# Patient Record
Sex: MALE | Race: WHITE | NOT HISPANIC OR LATINO | Employment: UNEMPLOYED | URBAN - METROPOLITAN AREA
[De-identification: names, ages, dates, MRNs, and addresses within clinical notes are randomized per-mention and may not be internally consistent; named-entity substitution may affect disease eponyms.]

---

## 2019-07-07 ENCOUNTER — OFFICE VISIT (OUTPATIENT)
Dept: URGENT CARE | Facility: CLINIC | Age: 34
End: 2019-07-07
Payer: COMMERCIAL

## 2019-07-07 VITALS
SYSTOLIC BLOOD PRESSURE: 107 MMHG | RESPIRATION RATE: 18 BRPM | DIASTOLIC BLOOD PRESSURE: 49 MMHG | HEART RATE: 57 BPM | OXYGEN SATURATION: 98 % | TEMPERATURE: 97 F | WEIGHT: 175 LBS

## 2019-07-07 DIAGNOSIS — S02.2XXA CLOSED FRACTURE OF NASAL BONE, INITIAL ENCOUNTER: Primary | ICD-10-CM

## 2019-07-07 DIAGNOSIS — S09.93XA FACIAL INJURY, INITIAL ENCOUNTER: ICD-10-CM

## 2019-07-07 PROCEDURE — 99214 PR OFFICE/OUTPT VISIT, EST, LEVL IV, 30-39 MIN: ICD-10-PCS | Mod: S$GLB,,, | Performed by: FAMILY MEDICINE

## 2019-07-07 PROCEDURE — 70160 XR NASAL BONES: ICD-10-PCS | Mod: FY,S$GLB,, | Performed by: RADIOLOGY

## 2019-07-07 PROCEDURE — 70160 X-RAY EXAM OF NASAL BONES: CPT | Mod: FY,S$GLB,, | Performed by: RADIOLOGY

## 2019-07-07 PROCEDURE — 99214 OFFICE O/P EST MOD 30 MIN: CPT | Mod: S$GLB,,, | Performed by: FAMILY MEDICINE

## 2019-07-07 NOTE — PATIENT INSTRUCTIONS
Nose Fracture, with X-Ray  A broken bone, or fracture, of the nose may be a minor crack. Or it may be a major break, with the parts of your nose pushed out of place. A fractured nose causes pain, swelling, and nasal stuffiness. You may have bleeding from your nose. By tomorrow, you may have bruising around your eyes.  A minor fracture will heal in 3 to 4 weeks, with no more treatment needed. A major break that changes the shape of your nose must be treated by a nose specialist, called an ENT (ear, nose, and throat) doctor.The ENT doctor will straighten the bones in your nose. This is called a reduction. Some fractures may need a reduction as soon as possible, such as when bleeding from the nose won't stop. Otherwise, it is best to wait a few days until the swelling has gone down. The doctor will then be able to easily see when your nose is back in the right position.    Home care  · Use an ice pack on your nose for no more than 15 to 20 minutes at a time. Do this every 1 to 2 hours for the first 24 to 48 hours. Then use the ice as needed to ease pain and swelling. To make an ice pack, put ice cubes in a plastic bag that seals at the top. Wrap the bag in a clean, thin towel or cloth. Never put ice or an ice pack directly on the skin.  · Tell your provider if you are taking aspirin or blood-thinning medicine. These medicines make it more likely that your nose will bleed. Your provider may need to change your dose.  · You may use over-the-counter pain medicine to control pain, unless another medicine was prescribed. If you have chronic liver or kidney disease, talk with your provider before using this medicine.  · Dont drink alcohol or hot liquids for the next 2 days. Alcohol and hot liquids can dilate blood vessels in your nose. This can cause bleeding.  · Dont blow your nose for the first 2 days. Then, do so gently so you don't cause bleeding.  · Dont play contact sports in the next 6 weeks unless you can  protect your nose from getting injured again. You can wear a special custom-fitted plastic face mask to protect your nose.  Special note on concussions  If you had any symptoms of a concussion today, dont return to sports or any activity that could result in another head injury.  These are symptoms of a concussion:  · Nausea  · Vomiting  · Dizziness  · Confusion  · Headache  · Memory loss  · Loss of consciousness  Wait until all of your symptoms are gone and your provider says its OK to resume your activity. Having a second head injury before you fully recover from the first one can lead to serious brain injury.  Follow-up care  Follow up with your healthcare provider, or as advised. If your nose looks crooked after the swelling goes down, call the ENT doctor for an appointment within the next 10 days. Also make an appointment if its still hard to breathe through 1 or both sides of your nose. If you have trouble getting an ENT appointment, call your regular provider.  If the bones are out of place, a reduction should be done 6 to 10 days after the injury. In children, the reduction should be done 3 to 7 days after the injury. After that time, the bones are more difficult to move back into place.  If you had X-rays taken, you will be told of any new findings that may affect your care.  When to seek medical advice  Call your healthcare provider right away if any of these occur:  · Bleeding from your nose even after you have pinched your nostrils together for 15 minutes without stopping  · Swelling, pain, or redness on your face that gets worse  · Fever of 100.4°F (38°C) or above lasting for 24 to 48 hours  · Can't breathe from both sides of your nose after swelling goes down  · Sinus pain  Call 911  Call 911 if you have:  · Repeated vomiting  · Severe headache or dizziness  · Headache or dizziness that gets worse  · Abnormal drowsiness, or unable to wake up as usual  · Confusion or change in behavior or  speech  · Convulsion, or seizure  Date Last Reviewed: 12/3/2015  © 3231-7490 [employer]. [address] [place] [address], [place] [address]. All rights reserved. This information is not intended as a substitute for professional medical care. Always follow your healthcare professional's instructions.

## 2019-07-07 NOTE — PROGRESS NOTES
Subjective:       Patient ID: Mike Yadav is a 33 y.o. male.    Vitals:  weight is 79.4 kg (175 lb). His temperature is 97 °F (36.1 °C). His blood pressure is 107/49 (abnormal) and his pulse is 57 (abnormal). His respiration is 18 and oxygen saturation is 98%.     Chief Complaint: Facial Injury    ? Broken nose from boxing  Happened yesterday and again today    Facial Injury    The incident occurred 12 to 24 hours ago. Pertinent negatives include no blurred vision. He has tried nothing for the symptoms. The treatment provided no relief.       Constitution: Negative for fatigue.   HENT: Negative for facial swelling and facial trauma.    Neck: Negative for neck stiffness.   Cardiovascular: Negative for chest trauma.   Eyes: Negative for eye trauma, double vision and blurred vision.   Gastrointestinal: Negative for abdominal trauma, abdominal pain and rectal bleeding.   Genitourinary: Negative for hematuria, genital trauma and pelvic pain.   Musculoskeletal: Negative for pain, trauma, joint swelling, abnormal ROM of joint and pain with walking.   Skin: Negative for color change, wound, abrasion and laceration.   Neurological: Negative for dizziness, history of vertigo, light-headedness, coordination disturbances, altered mental status and loss of consciousness.   Hematologic/Lymphatic: Negative for history of bleeding disorder.   Psychiatric/Behavioral: Negative for altered mental status.       Objective:      Physical Exam   Constitutional: He is oriented to person, place, and time. He appears well-developed and well-nourished.   HENT:   Head: Normocephalic and atraumatic.   Right Ear: External ear normal.   Left Ear: External ear normal.   Mouth/Throat: Oropharynx is clear and moist.   Low soft palate and high riding tongue-suspect MARIO  Left nares with some dried blood, the rt nares is clear. The nose is markedly deviated to the left. And tender and swollen to the touch at the bridge. No orbital edema or  tenderness or deformity   Eyes: Pupils are equal, round, and reactive to light. Conjunctivae and EOM are normal.   Neck: Normal range of motion. Neck supple.   Neurological: He is alert and oriented to person, place, and time.   Skin: Skin is warm and dry.   Psychiatric: He has a normal mood and affect. His behavior is normal. Judgment and thought content normal.   Nursing note and vitals reviewed.      Assessment:       1. Closed fracture of nasal bone, initial encounter    2. Facial injury, initial encounter        Plan:         Closed fracture of nasal bone, initial encounter  -     Ambulatory referral to ENT    Facial injury, initial encounter  -     X-Ray Nasal Bones; Future; Expected date: 07/07/2019      Patient Instructions     Nose Fracture, with X-Ray  A broken bone, or fracture, of the nose may be a minor crack. Or it may be a major break, with the parts of your nose pushed out of place. A fractured nose causes pain, swelling, and nasal stuffiness. You may have bleeding from your nose. By tomorrow, you may have bruising around your eyes.  A minor fracture will heal in 3 to 4 weeks, with no more treatment needed. A major break that changes the shape of your nose must be treated by a nose specialist, called an ENT (ear, nose, and throat) doctor.The ENT doctor will straighten the bones in your nose. This is called a reduction. Some fractures may need a reduction as soon as possible, such as when bleeding from the nose won't stop. Otherwise, it is best to wait a few days until the swelling has gone down. The doctor will then be able to easily see when your nose is back in the right position.    Home care  · Use an ice pack on your nose for no more than 15 to 20 minutes at a time. Do this every 1 to 2 hours for the first 24 to 48 hours. Then use the ice as needed to ease pain and swelling. To make an ice pack, put ice cubes in a plastic bag that seals at the top. Wrap the bag in a clean, thin towel or cloth.  Never put ice or an ice pack directly on the skin.  · Tell your provider if you are taking aspirin or blood-thinning medicine. These medicines make it more likely that your nose will bleed. Your provider may need to change your dose.  · You may use over-the-counter pain medicine to control pain, unless another medicine was prescribed. If you have chronic liver or kidney disease, talk with your provider before using this medicine.  · Dont drink alcohol or hot liquids for the next 2 days. Alcohol and hot liquids can dilate blood vessels in your nose. This can cause bleeding.  · Dont blow your nose for the first 2 days. Then, do so gently so you don't cause bleeding.  · Dont play contact sports in the next 6 weeks unless you can protect your nose from getting injured again. You can wear a special custom-fitted plastic face mask to protect your nose.  Special note on concussions  If you had any symptoms of a concussion today, dont return to sports or any activity that could result in another head injury.  These are symptoms of a concussion:  · Nausea  · Vomiting  · Dizziness  · Confusion  · Headache  · Memory loss  · Loss of consciousness  Wait until all of your symptoms are gone and your provider says its OK to resume your activity. Having a second head injury before you fully recover from the first one can lead to serious brain injury.  Follow-up care  Follow up with your healthcare provider, or as advised. If your nose looks crooked after the swelling goes down, call the ENT doctor for an appointment within the next 10 days. Also make an appointment if its still hard to breathe through 1 or both sides of your nose. If you have trouble getting an ENT appointment, call your regular provider.  If the bones are out of place, a reduction should be done 6 to 10 days after the injury. In children, the reduction should be done 3 to 7 days after the injury. After that time, the bones are more difficult to move back into  place.  If you had X-rays taken, you will be told of any new findings that may affect your care.  When to seek medical advice  Call your healthcare provider right away if any of these occur:  · Bleeding from your nose even after you have pinched your nostrils together for 15 minutes without stopping  · Swelling, pain, or redness on your face that gets worse  · Fever of 100.4°F (38°C) or above lasting for 24 to 48 hours  · Can't breathe from both sides of your nose after swelling goes down  · Sinus pain  Call 911  Call 911 if you have:  · Repeated vomiting  · Severe headache or dizziness  · Headache or dizziness that gets worse  · Abnormal drowsiness, or unable to wake up as usual  · Confusion or change in behavior or speech  · Convulsion, or seizure  Date Last Reviewed: 12/3/2015  © 2019-0036 [employer]. [address] [place] [address]. All rights reserved. This information is not intended as a substitute for professional medical care. Always follow your healthcare professional's instructions.

## 2019-07-08 ENCOUNTER — OFFICE VISIT (OUTPATIENT)
Dept: URGENT CARE | Facility: CLINIC | Age: 34
End: 2019-07-08
Payer: COMMERCIAL

## 2019-07-08 VITALS
BODY MASS INDEX: 25.05 KG/M2 | HEIGHT: 70 IN | SYSTOLIC BLOOD PRESSURE: 121 MMHG | RESPIRATION RATE: 18 BRPM | DIASTOLIC BLOOD PRESSURE: 71 MMHG | HEART RATE: 62 BPM | OXYGEN SATURATION: 100 % | TEMPERATURE: 97 F | WEIGHT: 175 LBS

## 2019-07-08 DIAGNOSIS — S06.0X0A CONCUSSION WITHOUT LOSS OF CONSCIOUSNESS, INITIAL ENCOUNTER: Primary | ICD-10-CM

## 2019-07-08 DIAGNOSIS — R07.89 STERNAL PAIN: ICD-10-CM

## 2019-07-08 DIAGNOSIS — S02.2XXD CLOSED FRACTURE OF NASAL BONE WITH ROUTINE HEALING, SUBSEQUENT ENCOUNTER: ICD-10-CM

## 2019-07-08 PROCEDURE — 99214 OFFICE O/P EST MOD 30 MIN: CPT | Mod: S$GLB,,, | Performed by: FAMILY MEDICINE

## 2019-07-08 PROCEDURE — 99214 PR OFFICE/OUTPT VISIT, EST, LEVL IV, 30-39 MIN: ICD-10-PCS | Mod: S$GLB,,, | Performed by: FAMILY MEDICINE

## 2019-07-08 PROCEDURE — 3008F BODY MASS INDEX DOCD: CPT | Mod: CPTII,S$GLB,, | Performed by: FAMILY MEDICINE

## 2019-07-08 PROCEDURE — 71120 X-RAY EXAM BREASTBONE 2/>VWS: CPT | Mod: FY,S$GLB,, | Performed by: RADIOLOGY

## 2019-07-08 PROCEDURE — 3008F PR BODY MASS INDEX (BMI) DOCUMENTED: ICD-10-PCS | Mod: CPTII,S$GLB,, | Performed by: FAMILY MEDICINE

## 2019-07-08 PROCEDURE — 71120 XR STERNUM PA AND LATERAL: ICD-10-PCS | Mod: FY,S$GLB,, | Performed by: RADIOLOGY

## 2019-07-08 NOTE — LETTER
July 8, 2019      [facility]  [address] [address]  Phone: [phone]  Fax: [phone]       Patient: Mike Yadav   YOB: 1985  Date of Visit: 07/08/2019    To Whom It May Concern:    Dennise Yadav  was at [facility] on 07/08/2019 for a concussion. Please excuse from work for 3 days and then return to work/school on 7/11/19 with general guidelines to rest and avoid overstimulation until his symptoms resolve. If you have any questions or concerns, or if I can be of further assistance, please do not hesitate to contact me.    Sincerely,    Valarie Carcamo, DO
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
## 2019-07-08 NOTE — PATIENT INSTRUCTIONS
"  Concussion    A concussion can be caused by a direct blow to the head, neck, face, or somewhere else on the body with the force being transmitted to the head. This may cause you to lose consciousness - be "knocked out" - but not always. Depending on the severity of the blow, it will take from a few hours up to a few days to get better. Sometimes symptoms may last a few months or longer. This is called post-concussion syndrome.  At first, you may have a headache, nausea, vomiting, or dizziness. You may also have problems concentrating or remembering things. This is normal.  Symptoms should get better as the hours and days go by. Symptoms that get worse could be a sign of a more serious injury. This might be a bruise or bleeding in the brain. Thats why its important to watch for the warning signs listed below.  Home care  If your injury is mild and there are no serious signs or symptoms, your healthcare provider may recommend that you be monitored at home. If there is evidence that the injury is more serious, you will be monitored in the hospital. Follow these tips to help care for yourself at home:  · After a concussion, your healthcare provider may recommend that a family member or friend monitor you for 12 to 24 hours. They may be told to wake you every few hours during sleep to check for the signs below.  · If your face or scalp swells, apply an ice pack for 20 minutes every 1 to 2 hours. Do this until the swelling starts to go down. You can make an ice pack by putting ice cubes in a plastic bag and wrapping the bag in a towel.  · You may use acetaminophen to control pain, unless another pain medicine was prescribed. Do not use aspirin or ibuprofen after a head injury. If you have chronic liver or kidney disease, talk with your doctor before using these medicines. Also talk with your doctor if you ever had a stomach ulcer or gastrointestinal bleeding.  · For the next 24 hours:  ¨ Dont drink alcohol or take " sedatives or medicines that make you sleepy.  ¨ Dont drive or operate machinery.  ¨ Avoid doing anything strenuous. Dont lift or strain.  · Dont return to sports or any activity that could cause you to hit your head until all symptoms are gone and you have been cleared by your doctor. A second head injury before fully recovering from the first one can lead to serious brain injury.  · Avoid doing activities that require a lot of concentration or a lot of attention. This will allow your brain to rest and heal quicker.  Follow-up care  Follow up with your doctor in 1 week, or as directed.  Note: A radiologist will review any X-rays or CT scans that were taken. You will be told of any new findings that may affect your care.  When to seek medical advice  Call your healthcare provider right away if any of these occur:  · Repeated vomiting  · Headache or dizziness that is severe or gets worse  · Loss of consciousness  · Unusual drowsiness, or unable to wake up as usual  · Weakness or decreased ability to walk or move any limb  · Confusion, agitation, or change in behavior or speech, or memory loss  · Blurred vision  · Convulsion (seizure)  · Swelling on the scalp or face that gets worse  · Changes in pupil size (the black part of the eye)  · Redness, warmth, or pus from the swollen area  · Fluid draining from or bleeding from the nose or ears     Date Last Reviewed: 8/14/2015  © 8859-6523 [employer]. [address] [place] [address]. All rights reserved. This information is not intended as a substitute for professional medical care. Always follow your healthcare professional's instructions.

## 2021-08-17 ENCOUNTER — OFFICE VISIT (OUTPATIENT)
Dept: UROLOGY | Facility: CLINIC | Age: 36
End: 2021-08-17

## 2021-08-17 VITALS
HEART RATE: 53 BPM | SYSTOLIC BLOOD PRESSURE: 118 MMHG | DIASTOLIC BLOOD PRESSURE: 66 MMHG | BODY MASS INDEX: 25.56 KG/M2 | WEIGHT: 178.56 LBS | HEIGHT: 70 IN

## 2021-08-17 DIAGNOSIS — Z30.2 ENCOUNTER FOR MALE STERILIZATION PROCEDURE: Primary | ICD-10-CM

## 2021-08-17 PROCEDURE — 99999 PR PBB SHADOW E&M-EST. PATIENT-LVL III: CPT | Mod: PBBFAC,,, | Performed by: UROLOGY

## 2021-08-17 PROCEDURE — 99213 OFFICE O/P EST LOW 20 MIN: CPT | Mod: PBBFAC | Performed by: UROLOGY

## 2021-08-17 PROCEDURE — 99204 OFFICE O/P NEW MOD 45 MIN: CPT | Mod: S$PBB,,, | Performed by: UROLOGY

## 2021-08-17 PROCEDURE — 99204 PR OFFICE/OUTPT VISIT, NEW, LEVL IV, 45-59 MIN: ICD-10-PCS | Mod: S$PBB,,, | Performed by: UROLOGY

## 2021-08-17 PROCEDURE — 99999 PR PBB SHADOW E&M-EST. PATIENT-LVL III: ICD-10-PCS | Mod: PBBFAC,,, | Performed by: UROLOGY

## 2021-08-17 RX ORDER — LIDOCAINE HYDROCHLORIDE 10 MG/ML
20 INJECTION INFILTRATION; PERINEURAL ONCE
Status: COMPLETED | OUTPATIENT
Start: 2021-09-16 | End: 2021-10-08

## 2021-10-08 ENCOUNTER — PROCEDURE VISIT (OUTPATIENT)
Dept: UROLOGY | Facility: CLINIC | Age: 36
End: 2021-10-08

## 2021-10-08 VITALS
SYSTOLIC BLOOD PRESSURE: 165 MMHG | RESPIRATION RATE: 16 BRPM | WEIGHT: 176.38 LBS | HEIGHT: 70 IN | TEMPERATURE: 99 F | DIASTOLIC BLOOD PRESSURE: 72 MMHG | HEART RATE: 52 BPM | BODY MASS INDEX: 25.25 KG/M2

## 2021-10-08 DIAGNOSIS — Z30.2 ENCOUNTER FOR MALE STERILIZATION PROCEDURE: ICD-10-CM

## 2021-10-08 PROCEDURE — 55250 REMOVAL OF SPERM DUCT(S): CPT | Mod: PBBFAC | Performed by: UROLOGY

## 2021-10-08 PROCEDURE — 55250 REMOVAL OF SPERM DUCT(S): CPT | Mod: S$PBB,,, | Performed by: UROLOGY

## 2021-10-08 PROCEDURE — 55250 PR REMOVAL OF SPERM DUCT(S): ICD-10-PCS | Mod: S$PBB,,, | Performed by: UROLOGY

## 2021-10-08 RX ORDER — CEPHALEXIN 500 MG/1
500 CAPSULE ORAL 4 TIMES DAILY
Qty: 8 CAPSULE | Refills: 0 | Status: SHIPPED | OUTPATIENT
Start: 2021-10-08 | End: 2021-10-10

## 2021-10-08 RX ORDER — OXYCODONE AND ACETAMINOPHEN 5; 325 MG/1; MG/1
1 TABLET ORAL EVERY 4 HOURS PRN
Qty: 8 TABLET | Refills: 0 | Status: SHIPPED | OUTPATIENT
Start: 2021-10-08 | End: 2021-10-18

## 2021-10-08 RX ADMIN — LIDOCAINE HYDROCHLORIDE 20 ML: 10 INJECTION, SOLUTION INFILTRATION; PERINEURAL at 08:10

## 2021-11-18 ENCOUNTER — TELEPHONE (OUTPATIENT)
Dept: UROLOGY | Facility: CLINIC | Age: 36
End: 2021-11-18

## 2021-11-30 ENCOUNTER — IMMUNIZATION (OUTPATIENT)
Dept: PRIMARY CARE CLINIC | Facility: CLINIC | Age: 36
End: 2021-11-30

## 2021-11-30 DIAGNOSIS — Z23 NEED FOR VACCINATION: Primary | ICD-10-CM

## 2021-11-30 PROCEDURE — 0004A COVID-19, MRNA, LNP-S, PF, 30 MCG/0.3 ML DOSE VACCINE: CPT | Mod: CV19,PBBFAC | Performed by: INTERNAL MEDICINE

## 2021-12-08 ENCOUNTER — TELEPHONE (OUTPATIENT)
Dept: UROLOGY | Facility: CLINIC | Age: 36
End: 2021-12-08

## 2023-07-18 ENCOUNTER — TELEPHONE (OUTPATIENT)
Dept: NEUROLOGY | Facility: CLINIC | Age: 38
End: 2023-07-18

## 2023-07-18 NOTE — TELEPHONE ENCOUNTER
Called Pt to reschedule his August 8 appt since it was scheduled incorrectly. Pt needs to be scheduled in a concussion slot. I was unable to speak with him but left a vm.

## 2023-07-25 ENCOUNTER — TELEPHONE (OUTPATIENT)
Dept: NEUROLOGY | Facility: CLINIC | Age: 38
End: 2023-07-25

## 2023-07-25 NOTE — TELEPHONE ENCOUNTER
Called Pt twice to offer an appt for tomorrow. I was unable to speak with him.       ----- Message from Ivette Ball MA sent at 7/19/2023  5:01 PM CDT -----  Regarding: FW: Missed call  Contact: [phone]    ----- Message -----  From: Yolanda Keller  Sent: 7/19/2023   4:13 PM CDT  To: Annemarie Srinivasan Staff  Subject: Missed call                                      Calling in regards to missed call from office to reschedule with concussion clinic. Please call and discuss.

## 2023-08-02 ENCOUNTER — TELEPHONE (OUTPATIENT)
Dept: NEUROLOGY | Facility: CLINIC | Age: 38
End: 2023-08-02

## 2023-08-02 NOTE — TELEPHONE ENCOUNTER
Pt called stating he is willing to pay out of pocket now. Pt is scheduled for August 7 at [facility]. Pt was advised to arrive 30 mins early for the appt.     ----- Message from Ivette Ball MA sent at 7/27/2023  4:30 PM CDT -----  Regarding: FW: earlier appt  Contact: pt [phone]    ----- Message -----  From: Jen Scott  Sent: 7/27/2023   2:33 PM CDT  To: Annemarie Srinivasan Staff  Subject: earlier appt                                     Pt is calling to speak with someone in provider office in regards to getting an earlier appt pt stated he was trying to get an earlier appt and was offered a reschedule date pt also stated if possible he would need a date before the end of this month for the concussion he has pt  is asking for a return call please call pt at  [phone]

## 2023-08-14 ENCOUNTER — OFFICE VISIT (OUTPATIENT)
Dept: NEUROLOGY | Facility: CLINIC | Age: 38
End: 2023-08-14

## 2023-08-14 VITALS
BODY MASS INDEX: 25.94 KG/M2 | HEART RATE: 66 BPM | DIASTOLIC BLOOD PRESSURE: 72 MMHG | WEIGHT: 180.75 LBS | SYSTOLIC BLOOD PRESSURE: 128 MMHG

## 2023-08-14 DIAGNOSIS — S06.0X0S CONCUSSION WITHOUT LOSS OF CONSCIOUSNESS, SEQUELA: Primary | ICD-10-CM

## 2023-08-14 PROBLEM — S06.0X0A CONCUSSION WITH NO LOSS OF CONSCIOUSNESS: Status: ACTIVE | Noted: 2023-08-14

## 2023-08-14 PROCEDURE — 99204 PR OFFICE/OUTPT VISIT, NEW, LEVL IV, 45-59 MIN: ICD-10-PCS | Mod: S$PBB,,, | Performed by: PSYCHIATRY & NEUROLOGY

## 2023-08-14 PROCEDURE — 99204 OFFICE O/P NEW MOD 45 MIN: CPT | Mod: S$PBB,,, | Performed by: PSYCHIATRY & NEUROLOGY

## 2023-08-14 PROCEDURE — 99999 PR PBB SHADOW E&M-EST. PATIENT-LVL III: CPT | Mod: PBBFAC,,, | Performed by: PSYCHIATRY & NEUROLOGY

## 2023-08-14 PROCEDURE — 99213 OFFICE O/P EST LOW 20 MIN: CPT | Mod: PBBFAC | Performed by: PSYCHIATRY & NEUROLOGY

## 2023-08-14 PROCEDURE — 99999 PR PBB SHADOW E&M-EST. PATIENT-LVL III: ICD-10-PCS | Mod: PBBFAC,,, | Performed by: PSYCHIATRY & NEUROLOGY

## 2023-08-14 NOTE — PATIENT INSTRUCTIONS
Would hold off on sparring for 60 days post injury  May continue conditioning and weight room work at this time  If symptoms return during or after increased activity, decrease activity and call the clinic at [phone] or contact me thru [facility].
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
## 2024-04-28 ENCOUNTER — HOSPITAL ENCOUNTER (EMERGENCY)
Facility: OTHER | Age: 39
Discharge: HOME | End: 2024-04-28
Attending: EMERGENCY MEDICINE

## 2024-04-28 VITALS
WEIGHT: 170 LBS | OXYGEN SATURATION: 99 % | HEART RATE: 70 BPM | DIASTOLIC BLOOD PRESSURE: 78 MMHG | SYSTOLIC BLOOD PRESSURE: 128 MMHG | RESPIRATION RATE: 16 BRPM | BODY MASS INDEX: 25.18 KG/M2 | TEMPERATURE: 98 F | HEIGHT: 69 IN

## 2024-04-28 DIAGNOSIS — S01.312A COMPLEX LACERATION OF LEFT EAR, INITIAL ENCOUNTER: Primary | ICD-10-CM

## 2024-04-28 PROCEDURE — 25000003 PHARM REV CODE 250: Performed by: EMERGENCY MEDICINE

## 2024-04-28 PROCEDURE — 99284 EMERGENCY DEPT VISIT MOD MDM: CPT | Mod: 25

## 2024-04-28 PROCEDURE — 90715 TDAP VACCINE 7 YRS/> IM: CPT | Performed by: EMERGENCY MEDICINE

## 2024-04-28 PROCEDURE — 13152 CMPLX RPR E/N/E/L 2.6-7.5 CM: CPT

## 2024-04-28 PROCEDURE — 90471 IMMUNIZATION ADMIN: CPT | Performed by: EMERGENCY MEDICINE

## 2024-04-28 PROCEDURE — 63600175 PHARM REV CODE 636 W HCPCS: Performed by: EMERGENCY MEDICINE

## 2024-04-28 PROCEDURE — 25000003 PHARM REV CODE 250: Performed by: INTERNAL MEDICINE

## 2024-04-28 RX ORDER — CIPROFLOXACIN 500 MG/1
500 TABLET ORAL 2 TIMES DAILY
Qty: 14 TABLET | Refills: 0 | Status: SHIPPED | OUTPATIENT
Start: 2024-04-28 | End: 2024-05-05

## 2024-04-28 RX ORDER — LIDOCAINE HYDROCHLORIDE AND EPINEPHRINE 10; 10 MG/ML; UG/ML
20 INJECTION, SOLUTION INFILTRATION; PERINEURAL ONCE
Status: DISCONTINUED | OUTPATIENT
Start: 2024-04-28 | End: 2024-04-28

## 2024-04-28 RX ORDER — LIDOCAINE HYDROCHLORIDE AND EPINEPHRINE 10; 10 MG/ML; UG/ML
1 INJECTION, SOLUTION INFILTRATION; PERINEURAL ONCE
Status: COMPLETED | OUTPATIENT
Start: 2024-04-28 | End: 2024-04-28

## 2024-04-28 RX ORDER — LIDOCAINE HYDROCHLORIDE 10 MG/ML
10 INJECTION, SOLUTION EPIDURAL; INFILTRATION; INTRACAUDAL; PERINEURAL ONCE
Status: COMPLETED | OUTPATIENT
Start: 2024-04-28 | End: 2024-04-28

## 2024-04-28 RX ORDER — HYDROCODONE BITARTRATE AND ACETAMINOPHEN 5; 325 MG/1; MG/1
1 TABLET ORAL EVERY 4 HOURS PRN
Qty: 12 TABLET | Refills: 0 | Status: SHIPPED | OUTPATIENT
Start: 2024-04-28 | End: 2024-05-08

## 2024-04-28 RX ADMIN — LIDOCAINE HYDROCHLORIDE 100 MG: 10 INJECTION, SOLUTION EPIDURAL; INFILTRATION; INTRACAUDAL at 05:04

## 2024-04-28 RX ADMIN — LIDOCAINE HYDROCHLORIDE,EPINEPHRINE BITARTRATE 1 ML: 10; .01 INJECTION, SOLUTION INFILTRATION; PERINEURAL at 07:04

## 2024-04-28 RX ADMIN — TETANUS TOXOID, REDUCED DIPHTHERIA TOXOID AND ACELLULAR PERTUSSIS VACCINE, ADSORBED 0.5 ML: 5; 2.5; 8; 8; 2.5 SUSPENSION INTRAMUSCULAR at 07:04

## 2024-04-28 NOTE — ED PROVIDER NOTES
Encounter Date: 2024       History     Chief Complaint   Patient presents with    Laceration     Laceration to left ear, states was wrestling with a friend, bleeding controlled,      Seen:  6:05 a.m.  38-year-old male  presents complaint of an injury to the left ear.  He reports that he was wrestling with a friend and his ear was pulled on the corner of furniture.  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  He states his last tetanus shot was greater than 5 years ago.  He denies any other injury.    The history is provided by the patient.     Review of patient's allergies indicates:  No Known Allergies  No past medical history on file.  Past Surgical History:   Procedure Laterality Date    LEG SURGERY      NOSE SURGERY       Family History   Problem Relation Name Age of Onset    No Known Problems Mother      No Known Problems Father      Multiple sclerosis Paternal Uncle       Social History     Tobacco Use    Smoking status: Former     Current packs/day: 0.00     Types: Cigarettes     Quit date: 2019     Years since quittin.8    Smokeless tobacco: Never   Substance Use Topics    Alcohol use: Yes     Comment: socially    Drug use: Yes     Types: Marijuana     Review of Systems   Constitutional:  Negative for chills and fever.   HENT:  Negative for congestion and sore throat.    Eyes:  Negative for visual disturbance.   Respiratory:  Negative for cough and shortness of breath.    Cardiovascular:  Negative for chest pain and palpitations.   Gastrointestinal:  Negative for abdominal pain, diarrhea and vomiting.   Genitourinary:  Negative for decreased urine volume, dysuria and frequency.   Musculoskeletal:  Negative for joint swelling, neck pain and neck stiffness.   Skin:  Positive for wound (Left ear laceration). Negative for rash.   Neurological:  Negative for weakness, numbness and headaches.   Psychiatric/Behavioral:  Negative for  behavioral problems and confusion.        Physical Exam     Initial Vitals [04/28/24 0336]   BP Pulse Resp Temp SpO2   135/82 78 16 98.2 °F (36.8 °C) 98 %      MAP       --         Physical Exam    Constitutional: He appears well-developed and well-nourished.   HENT:   Head: Normocephalic.   Nose: Nose normal.   Mouth/Throat: Oropharynx is clear and moist.   Left external ear with large complex full-thickness laceration involving both skin and cartilage.  See image below.  Wound measures approximately 4 cm.   Eyes: Conjunctivae and EOM are normal. Pupils are equal, round, and reactive to light.   Neck: Neck supple.   Normal range of motion.  Cardiovascular:  Normal rate and regular rhythm.     Exam reveals no gallop and no friction rub.       No murmur heard.  Pulmonary/Chest: Breath sounds normal. No respiratory distress. He has no wheezes. He has no rales.   Abdominal: Abdomen is soft. Bowel sounds are normal. There is no abdominal tenderness. There is no rebound and no guarding.   Musculoskeletal:         General: No tenderness or edema.      Cervical back: Normal range of motion and neck supple.     Neurological: He is alert and oriented to person, place, and time. He has normal strength. No cranial nerve deficit or sensory deficit. Gait normal. GCS score is 15. GCS eye subscore is 4. GCS verbal subscore is 5. GCS motor subscore is 6.   Skin: Skin is warm and dry. No rash noted.   Psychiatric: He has a normal mood and affect. His speech is normal and behavior is normal.         ED Course   Procedures  Labs Reviewed - No data to display       Imaging Results    None          Medications   LIDOcaine (PF) 10 mg/ml (1%) injection 100 mg (100 mg Other Given by Other 4/28/24 3589)   Tdap (BOOSTRIX) vaccine injection 0.5 mL (0.5 mLs Intramuscular Given 4/28/24 6277)   LIDOcaine-EPINEPHrine 1%-1:100,000 injection 1 mL (1 mL Intradermal Given by Provider 4/28/24 8429)     Medical Decision Making  Emergent evaluation a  38-year-old male who presents with complex ear laceration.  There is no evidence for any associated injury.  Vital signs are benign, afebrile.  On exam there is a complex full-thickness laceration involving cartilage.  Plastic surgery Dr. Sharpe came to the bedside and performed complex repair.  Patient is discharged in good condition with prescription for Cipro given cartilage involvement and Norco for pain.  All sutures placed were absorbable and will not require removal.  He is discharged with strict return precautions and encouraged close follow-up.    Amount and/or Complexity of Data Reviewed  Discussion of management or test interpretation with external provider(s): I discussed the case with Dr. Sharpe, plastic surgery.    Risk  Prescription drug management.  Minor surgery with no identified risk factors.               ED Course as of 04/28/24 1013   Sun Apr 28, 2024   0634 I successfully paged Plastic surgery through the answering service. [AK]   5744 I spoke with Dr. Redman, plastic surgery.  He will evaluate the patient and make repair. [AK]   0417 Plastic surgery is at the bedside. [AK]      ED Course User Index  [AK] Pricilla Fraire MD                           Clinical Impression:  Final diagnoses:  [S01.312A] Complex laceration of left ear, initial encounter (Primary)          ED Disposition Condition    Discharge Stable          ED Prescriptions       Medication Sig Dispense Start Date End Date Auth. Provider    ciprofloxacin HCl (CIPRO) 500 MG tablet Take 1 tablet (500 mg total) by mouth 2 (two) times a day. for 7 days 14 tablet 4/28/2024 5/5/2024 Pricilla Fraire MD    HYDROcodone-acetaminophen (NORCO) 5-325 mg per tablet Take 1 tablet by mouth every 4 (four) hours as needed for Pain. 12 tablet 4/28/2024 5/8/2024 Pricilla Fraire MD          Follow-up Information       Follow up With Specialties Details Why Contact Info    Your regular primary care doctor  Schedule an appointment as soon as possible  for a visit  For symptom recheck and close follow-up     [facility] Emergency Medicine  As needed, [facility] symptoms worsen [address] [facility] [id number] [phone]             Pricilla Fraire MD  04/28/24 1017

## 2024-04-28 NOTE — DISCHARGE INSTRUCTIONS
Keep wound clean and dry, do not soak or swim until healed.  Take antibiotics as directed.  Return to the ER immediately for new or worsening symptoms such as redness, drainage of pus, fever, or other concerns.

## 2024-04-28 NOTE — ED TRIAGE NOTES
Patient presents to the ED with complaints of having a laceration to the left ear. States injury occurred while wresting tonight. No active bleeding at this time. No distress noted.

## 2024-04-28 NOTE — CONSULTS
"Plastic Surgery Consult Note    HPI: Mike Yadav is a 38 year old male  presenting with left ear laceration after wrestling earlier this morning around 2 am. Laceration occurred when his ear was pulled on corner of the furniture. He has bilateral preexisting cauliflower ears.     PMH:   No past medical history on file.    Surg:   Past Surgical History:   Procedure Laterality Date    LEG SURGERY      NOSE SURGERY         Social:     Review of patient's allergies indicates:  No Known Allergies      Current Outpatient Medications   Medication Instructions    ciprofloxacin HCl (CIPRO) 500 mg, Oral, 2 times daily    HYDROcodone-acetaminophen (NORCO) 5-325 mg per tablet 1 tablet, Oral, Every 4 hours PRN         Blood pressure 135/82, pulse 78, temperature 98.2 °F (36.8 °C), temperature source Oral, resp. rate 16, height 5' 9" (1.753 m), weight 77.1 kg (170 lb), SpO2 98%.  PE:  NAD  EOMI, left ear stellate laceration over superior helical rim extending into scapha anteriorly. Cartilage is exposed and lacerated. Scarring present from prior history of cauliflower ear.   RRR  Non-labored breathing      A/P:   Mike Yadav is a 38 year old male  presenting with a left ear laceration through cartilage    - laceration repaired at bedside  - recommend cipro for antibiotics  - remove xeroform packing in 2 days  - bacitracin BID to laceration repair  - head of bed elevation   - pain control per ED  - Follow up with plastic surgery PRN    Procedure note:  After verbal consent and a formal timeout was performed 10 ccs of 1% Lidocaine with epinephrine was used to infiltrate around ear as a ring block. The laceration was copiously irrigated with 1 liter of normal saline and prepped with betadine solution. The ear cartilage was approximated with 4-0 vicryl suture in interrupted fashion. Skin was approximated with 5-0 fast gut in simple interrupted fashion. The skin was cleaned and the scapha " packed with a strip of xeroform. Patient tolerated procedure well without complication.     Gautam Sharpe MD  [facility], PGY-IV  04/28/2024  10:13 AM

## 2025-07-14 ENCOUNTER — HOSPITAL ENCOUNTER (OUTPATIENT)
Facility: OTHER | Age: 40
Discharge: HOME | End: 2025-07-15
Attending: EMERGENCY MEDICINE | Admitting: HOSPITALIST
Payer: COMMERCIAL

## 2025-07-14 DIAGNOSIS — L03.115 CELLULITIS AND ABSCESS OF RIGHT LEG: Primary | ICD-10-CM

## 2025-07-14 DIAGNOSIS — L02.91 ABSCESS: ICD-10-CM

## 2025-07-14 DIAGNOSIS — L02.415 CELLULITIS AND ABSCESS OF RIGHT LEG: Primary | ICD-10-CM

## 2025-07-14 DIAGNOSIS — R07.9 CHEST PAIN: ICD-10-CM

## 2025-07-14 DIAGNOSIS — L03.115 CELLULITIS OF RIGHT LOWER EXTREMITY: ICD-10-CM

## 2025-07-14 DIAGNOSIS — Z16.20 THERAPY FAILURE DUE TO ANTIBIOTIC RESISTANCE: ICD-10-CM

## 2025-07-14 DIAGNOSIS — M79.89 LEG SWELLING: ICD-10-CM

## 2025-07-14 LAB
ABSOLUTE EOSINOPHIL (OHS): 0.15 K/UL
ABSOLUTE MONOCYTE (OHS): 0.98 K/UL (ref 0.3–1)
ABSOLUTE NEUTROPHIL COUNT (OHS): 10.88 K/UL (ref 1.8–7.7)
ALBUMIN SERPL BCP-MCNC: 4 G/DL (ref 3.5–5.2)
ALP SERPL-CCNC: 94 UNIT/L (ref 40–150)
ALT SERPL W/O P-5'-P-CCNC: 15 UNIT/L (ref 10–44)
ANION GAP (OHS): 11 MMOL/L (ref 8–16)
AST SERPL-CCNC: 21 UNIT/L (ref 11–45)
BASOPHILS # BLD AUTO: 0.07 K/UL
BASOPHILS NFR BLD AUTO: 0.5 %
BILIRUB SERPL-MCNC: 0.7 MG/DL (ref 0.1–1)
BUN SERPL-MCNC: 11 MG/DL (ref 6–20)
CALCIUM SERPL-MCNC: 9.3 MG/DL (ref 8.7–10.5)
CHLORIDE SERPL-SCNC: 101 MMOL/L (ref 95–110)
CO2 SERPL-SCNC: 25 MMOL/L (ref 23–29)
CREAT SERPL-MCNC: 0.8 MG/DL (ref 0.5–1.4)
CRP SERPL-MCNC: 115.2 MG/L
ERYTHROCYTE [DISTWIDTH] IN BLOOD BY AUTOMATED COUNT: 12.7 % (ref 11.5–14.5)
ERYTHROCYTE [SEDIMENTATION RATE] IN BLOOD BY PHOTOMETRIC METHOD: 75 MM/HR
GFR SERPLBLD CREATININE-BSD FMLA CKD-EPI: >60 ML/MIN/1.73/M2
GLUCOSE SERPL-MCNC: 89 MG/DL (ref 70–110)
HCT VFR BLD AUTO: 39.7 % (ref 40–54)
HGB BLD-MCNC: 13 GM/DL (ref 14–18)
HOLD SPECIMEN: NORMAL
IMM GRANULOCYTES # BLD AUTO: 0.06 K/UL (ref 0–0.04)
IMM GRANULOCYTES NFR BLD AUTO: 0.5 % (ref 0–0.5)
LACTATE SERPL-SCNC: 0.8 MMOL/L (ref 0.5–2.2)
LYMPHOCYTES # BLD AUTO: 1.17 K/UL (ref 1–4.8)
MCH RBC QN AUTO: 31 PG (ref 27–31)
MCHC RBC AUTO-ENTMCNC: 32.7 G/DL (ref 32–36)
MCV RBC AUTO: 95 FL (ref 82–98)
NUCLEATED RBC (/100WBC) (OHS): 0 /100 WBC
PLATELET # BLD AUTO: 332 K/UL (ref 150–450)
PMV BLD AUTO: 9.1 FL (ref 9.2–12.9)
POTASSIUM SERPL-SCNC: 3.8 MMOL/L (ref 3.5–5.1)
PROCALCITONIN SERPL-MCNC: 0.05 NG/ML
PROT SERPL-MCNC: 8.2 GM/DL (ref 6–8.4)
RBC # BLD AUTO: 4.2 M/UL (ref 4.6–6.2)
RELATIVE EOSINOPHIL (OHS): 1.1 %
RELATIVE LYMPHOCYTE (OHS): 8.8 % (ref 18–48)
RELATIVE MONOCYTE (OHS): 7.4 % (ref 4–15)
RELATIVE NEUTROPHIL (OHS): 81.7 % (ref 38–73)
SODIUM SERPL-SCNC: 137 MMOL/L (ref 136–145)
WBC # BLD AUTO: 13.31 K/UL (ref 3.9–12.7)

## 2025-07-14 PROCEDURE — 84145 PROCALCITONIN (PCT): CPT | Performed by: EMERGENCY MEDICINE

## 2025-07-14 PROCEDURE — 83605 ASSAY OF LACTIC ACID: CPT | Performed by: EMERGENCY MEDICINE

## 2025-07-14 PROCEDURE — G0378 HOSPITAL OBSERVATION PER HR: HCPCS

## 2025-07-14 PROCEDURE — 85025 COMPLETE CBC W/AUTO DIFF WBC: CPT | Performed by: EMERGENCY MEDICINE

## 2025-07-14 PROCEDURE — 25500020 PHARM REV CODE 255: Performed by: EMERGENCY MEDICINE

## 2025-07-14 PROCEDURE — 96372 THER/PROPH/DIAG INJ SC/IM: CPT | Performed by: HOSPITALIST

## 2025-07-14 PROCEDURE — 99204 OFFICE O/P NEW MOD 45 MIN: CPT | Mod: 25,,, | Performed by: SURGERY

## 2025-07-14 PROCEDURE — 25000003 PHARM REV CODE 250: Performed by: EMERGENCY MEDICINE

## 2025-07-14 PROCEDURE — 87040 BLOOD CULTURE FOR BACTERIA: CPT | Mod: 91 | Performed by: HOSPITALIST

## 2025-07-14 PROCEDURE — 25000003 PHARM REV CODE 250: Performed by: HOSPITALIST

## 2025-07-14 PROCEDURE — 63600175 PHARM REV CODE 636 W HCPCS: Performed by: EMERGENCY MEDICINE

## 2025-07-14 PROCEDURE — 85652 RBC SED RATE AUTOMATED: CPT | Performed by: EMERGENCY MEDICINE

## 2025-07-14 PROCEDURE — 86140 C-REACTIVE PROTEIN: CPT | Performed by: EMERGENCY MEDICINE

## 2025-07-14 PROCEDURE — A4216 STERILE WATER/SALINE, 10 ML: HCPCS | Performed by: HOSPITALIST

## 2025-07-14 PROCEDURE — 80053 COMPREHEN METABOLIC PANEL: CPT | Performed by: EMERGENCY MEDICINE

## 2025-07-14 PROCEDURE — 63600175 PHARM REV CODE 636 W HCPCS: Performed by: HOSPITALIST

## 2025-07-14 RX ORDER — BISACODYL 10 MG/1
10 SUPPOSITORY RECTAL DAILY PRN
Status: DISCONTINUED | OUTPATIENT
Start: 2025-07-14 | End: 2025-07-15 | Stop reason: HOSPADM

## 2025-07-14 RX ORDER — ACETAMINOPHEN 325 MG/1
650 TABLET ORAL EVERY 4 HOURS PRN
Status: DISCONTINUED | OUTPATIENT
Start: 2025-07-14 | End: 2025-07-15 | Stop reason: HOSPADM

## 2025-07-14 RX ORDER — CEFEPIME HYDROCHLORIDE 2 G/1
2 INJECTION, POWDER, FOR SOLUTION INTRAVENOUS
Status: COMPLETED | OUTPATIENT
Start: 2025-07-14 | End: 2025-07-14

## 2025-07-14 RX ORDER — SODIUM CHLORIDE 0.9 % (FLUSH) 0.9 %
10 SYRINGE (ML) INJECTION EVERY 8 HOURS
Status: DISCONTINUED | OUTPATIENT
Start: 2025-07-14 | End: 2025-07-15 | Stop reason: HOSPADM

## 2025-07-14 RX ORDER — VANCOMYCIN 2 GRAM/500 ML IN 0.9 % SODIUM CHLORIDE INTRAVENOUS
2000 ONCE
Status: COMPLETED | OUTPATIENT
Start: 2025-07-14 | End: 2025-07-14

## 2025-07-14 RX ORDER — ONDANSETRON HYDROCHLORIDE 2 MG/ML
4 INJECTION, SOLUTION INTRAVENOUS EVERY 8 HOURS PRN
Status: DISCONTINUED | OUTPATIENT
Start: 2025-07-14 | End: 2025-07-15 | Stop reason: HOSPADM

## 2025-07-14 RX ORDER — PROCHLORPERAZINE EDISYLATE 5 MG/ML
5 INJECTION INTRAMUSCULAR; INTRAVENOUS EVERY 6 HOURS PRN
Status: DISCONTINUED | OUTPATIENT
Start: 2025-07-14 | End: 2025-07-15 | Stop reason: HOSPADM

## 2025-07-14 RX ORDER — GLUCAGON 1 MG
1 KIT INJECTION
Status: DISCONTINUED | OUTPATIENT
Start: 2025-07-14 | End: 2025-07-15 | Stop reason: HOSPADM

## 2025-07-14 RX ORDER — IBUPROFEN 200 MG
16 TABLET ORAL
Status: DISCONTINUED | OUTPATIENT
Start: 2025-07-14 | End: 2025-07-15 | Stop reason: HOSPADM

## 2025-07-14 RX ORDER — POLYETHYLENE GLYCOL 3350 17 G/17G
17 POWDER, FOR SOLUTION ORAL DAILY
Status: DISCONTINUED | OUTPATIENT
Start: 2025-07-15 | End: 2025-07-15 | Stop reason: HOSPADM

## 2025-07-14 RX ORDER — ENOXAPARIN SODIUM 100 MG/ML
40 INJECTION SUBCUTANEOUS EVERY 24 HOURS
Status: DISCONTINUED | OUTPATIENT
Start: 2025-07-14 | End: 2025-07-15 | Stop reason: HOSPADM

## 2025-07-14 RX ORDER — TALC
6 POWDER (GRAM) TOPICAL NIGHTLY PRN
Status: DISCONTINUED | OUTPATIENT
Start: 2025-07-14 | End: 2025-07-15 | Stop reason: HOSPADM

## 2025-07-14 RX ORDER — CEFTRIAXONE 1 G/1
1 INJECTION, POWDER, FOR SOLUTION INTRAMUSCULAR; INTRAVENOUS
Status: DISCONTINUED | OUTPATIENT
Start: 2025-07-14 | End: 2025-07-15 | Stop reason: HOSPADM

## 2025-07-14 RX ORDER — ESCITALOPRAM OXALATE 10 MG/1
10 TABLET ORAL DAILY
COMMUNITY

## 2025-07-14 RX ORDER — CLINDAMYCIN PHOSPHATE 600 MG/50ML
600 INJECTION, SOLUTION INTRAVENOUS
Status: COMPLETED | OUTPATIENT
Start: 2025-07-14 | End: 2025-07-14

## 2025-07-14 RX ORDER — IBUPROFEN 200 MG
24 TABLET ORAL
Status: DISCONTINUED | OUTPATIENT
Start: 2025-07-14 | End: 2025-07-15 | Stop reason: HOSPADM

## 2025-07-14 RX ORDER — INSULIN ASPART 100 [IU]/ML
0-5 INJECTION, SOLUTION INTRAVENOUS; SUBCUTANEOUS
Status: DISCONTINUED | OUTPATIENT
Start: 2025-07-14 | End: 2025-07-15 | Stop reason: HOSPADM

## 2025-07-14 RX ADMIN — CEFEPIME 2 G: 2 INJECTION, POWDER, FOR SOLUTION INTRAVENOUS at 03:07

## 2025-07-14 RX ADMIN — IOHEXOL 75 ML: 350 INJECTION, SOLUTION INTRAVENOUS at 01:07

## 2025-07-14 RX ADMIN — CEFTRIAXONE 1 G: 1 INJECTION, POWDER, FOR SOLUTION INTRAMUSCULAR; INTRAVENOUS at 05:07

## 2025-07-14 RX ADMIN — CLINDAMYCIN PHOSPHATE 600 MG: 600 INJECTION, SOLUTION INTRAVENOUS at 03:07

## 2025-07-14 RX ADMIN — Medication 10 ML: at 10:07

## 2025-07-14 RX ADMIN — ENOXAPARIN SODIUM 40 MG: 40 INJECTION SUBCUTANEOUS at 05:07

## 2025-07-14 RX ADMIN — SODIUM CHLORIDE 2000 MG: 9 INJECTION, SOLUTION INTRAVENOUS at 12:07

## 2025-07-14 NOTE — ED PROVIDER NOTES
Encounter Date: 7/14/2025    SCRIBE #1 NOTE: I, Morenita Leone, am scribing for, and in the presence of,  Pricilla Fraire MD. I have scribed the following portions of the note - Other sections scribed: HPI, ROS.       History     Chief Complaint   Patient presents with    Leg Pain     Pt. Present to the Ed with right leg pain. Pt. Admits to kicking a stop sign 2 months ago and cutting his leg. Pt. Was taking antibiotics for 15 days an it was getting better. Now it's  swollen ,red an hot to touch. Pt. Is alert an all vitals are within normal limits. GSC-15     39 y.o. male who presents with complaint of right leg pain with swelling and redness. He reports kicking a stop sign and cutting his leg 2 months ago. Following the injury, he did not see a physician, but the wound seemed to heal. He is an  and notes that he stopped training after the injury for about two weeks.  When he started back training he noticed increasing redness and pain of his right leg. He went to an Urgent Care where he was given antibiotics with improvement.  He is allergic to penicillin, but completed a course of doxycycline and topical Bactroban ointment with resolution of redness.    He has noticed recurrent redness, swelling, and pain of his right leg over the past 3 days. He reports subjective fever and chills yesterday. His pain worsens with ambulation and to touch, no pain at rest. He denies pus or drainage from the area. He does not use tobacco products. He drinks about 4-6 drinks daily. He occasionally uses marijuana but denies illicit drug use. This is the extent of the patient's complaints at this time.          The history is provided by the patient. No  was used.     Review of patient's allergies indicates:   Allergen Reactions    Amoxicillin Hives     Past Medical History:   Diagnosis Date    Concussion 2019     Past Surgical History:   Procedure Laterality Date    LEG SURGERY Bilateral     R femur fx  and L tib/fib fx repair    NOSE SURGERY      Fracture repair in 2019     Family History   Problem Relation Name Age of Onset    No Known Problems Mother      No Known Problems Father      No Known Problems Sister      Multiple sclerosis Paternal Uncle       Social History[1] positive for tobacco, occasional alcohol.  No injection drugs.  Review of Systems   Constitutional:  Negative for chills and fever.   HENT:  Negative for congestion and sore throat.    Eyes:  Negative for visual disturbance.   Respiratory:  Negative for cough and shortness of breath.    Cardiovascular:  Negative for chest pain and palpitations.   Gastrointestinal:  Negative for abdominal pain, diarrhea and vomiting.   Genitourinary:  Negative for decreased urine volume, dysuria and frequency.   Musculoskeletal:  Positive for arthralgias. Negative for joint swelling, neck pain and neck stiffness.   Skin:  Positive for color change and wound. Negative for rash.   Neurological:  Negative for weakness, numbness and headaches.   Psychiatric/Behavioral:  Negative for behavioral problems and confusion.        Physical Exam     Initial Vitals [07/14/25 1106]   BP Pulse Resp Temp SpO2   135/77 73 18 98.1 °F (36.7 °C) 98 %      MAP       --         Physical Exam    Constitutional: He appears well-developed and well-nourished.   HENT:   Head: Normocephalic and atraumatic.   Nose: Nose normal. Mouth/Throat: Oropharynx is clear and moist.   Eyes: Conjunctivae and EOM are normal. Pupils are equal, round, and reactive to light.   Neck: Neck supple.   Normal range of motion.  Cardiovascular:  Normal rate and regular rhythm.     Exam reveals no gallop and no friction rub.       No murmur heard.  Pulmonary/Chest: Breath sounds normal. No respiratory distress. He has no wheezes. He has no rales.   Abdominal: Abdomen is soft. Bowel sounds are normal. There is no abdominal tenderness. There is no rebound and no guarding.   Musculoskeletal:         General:  Tenderness and edema present.      Cervical back: Normal range of motion and neck supple.      Comments: Right mid lower leg with large amount of swelling and erythema with fluctuance over the tibia.  There is a small overlying abrasion without drainage.     Neurological: He is alert and oriented to person, place, and time. He has normal strength. No cranial nerve deficit or sensory deficit. Gait normal. GCS score is 15. GCS eye subscore is 4. GCS verbal subscore is 5. GCS motor subscore is 6.   Skin: Skin is warm and dry. No rash noted.   Psychiatric: He has a normal mood and affect. His speech is normal and behavior is normal.         ED Course   Procedures  Labs Reviewed   SEDIMENTATION RATE - Abnormal       Result Value    Sed Rate 75 (*)    C-REACTIVE PROTEIN - Abnormal    .2 (*)    CBC WITH DIFFERENTIAL - Abnormal    WBC 13.31 (*)     RBC 4.20 (*)     HGB 13.0 (*)     HCT 39.7 (*)     MCV 95      MCH 31.0      MCHC 32.7      RDW 12.7      Platelet Count 332      MPV 9.1 (*)     Nucleated RBC 0      Neut % 81.7 (*)     Lymph % 8.8 (*)     Mono % 7.4      Eos % 1.1      Basophil % 0.5      Imm Grans % 0.5      Neut # 10.88 (*)     Lymph # 1.17      Mono # 0.98      Eos # 0.15      Baso # 0.07      Imm Grans # 0.06 (*)    COMPREHENSIVE METABOLIC PANEL - Normal    Sodium 137      Potassium 3.8      Chloride 101      CO2 25      Glucose 89      BUN 11      Creatinine 0.8      Calcium 9.3      Protein Total 8.2      Albumin 4.0      Bilirubin Total 0.7      ALP 94      AST 21      ALT 15      Anion Gap 11      eGFR >60     LACTIC ACID, PLASMA - Normal    Lactic Acid Level 0.8      Narrative:     Falsely low lactic acid results can be found in samples containing >=13.0 mg/dL total bilirubin and/or >=3.5 mg/dL direct bilirubin.    PROCALCITONIN - Normal    Procalcitonin 0.05     CBC W/ AUTO DIFFERENTIAL    Narrative:     The following orders were created for panel order CBC auto differential.  Procedure                                Abnormality         Status                     ---------                               -----------         ------                     CBC with Differential[[id number]]       Abnormal            Final result                 Please view results for these tests on the individual orders.   EXTRA TUBES    Narrative:     The following orders were created for panel order EXTRA TUBES.  Procedure                               Abnormality         Status                     ---------                               -----------         ------                     Light Green Top Hold[[id number]]                            Final result                 Please view results for these tests on the individual orders.   LIGHT GREEN TOP HOLD    Extra Tube Hold for add-ons.            Imaging Results              CT Leg (Tibia-Fibula) Wtih Contrast Right (Final result)  Result time 07/14/25 14:39:14      Final result by Marlon Soto III, MD (07/14/25 14:39:14)                   Impression:      No evidence of osteomyelitis.    Fluid collection with rim enhancement and small gas bubbles anterior to the distal tibia concerning for an abscess collection.    Generalized edema and fluid tracking within the soft tissues of the right leg.      Electronically signed by: Marlon Soto MD  Date:    07/14/2025  Time:    14:39               Narrative:    EXAMINATION:  CT LEG (TIBIA-FIBULA) WITH CONTRAST RIGHT    CLINICAL HISTORY:  Osteomyelitis suspected, tib/fib, xray done;    FINDINGS:  Leg tib fib.    Patient was administered 75 cc of Omnipaque 350 intravenously.    There is subcutaneous edema, and fluid seen tracking within the soft tissues of the distal leg.  Anteriorly there is a rim enhancing collection containing gas bubbles anterior to the tibia.  This measures 7.8 cm craniocaudal, 3.4 cm transverse, and 2.1 cm AP.  This is consistent with a subcutaneous abscess.  There is also edema and cellulitis.  Posteriorly  there is are some none rim enhancing collections thought to be edema in fluid tracking within the soft tissues.  No bone destruction is seen to suggest osteomyelitis.                                       X-Ray Tibia Fibula 2 View Right (Final result)  Result time 07/14/25 12:06:11      Final result by Marlon Soto III, MD (07/14/25 12:06:11)                   Impression:      No acute process seen.      Electronically signed by: Marlon Soto MD  Date:    07/14/2025  Time:    12:06               Narrative:    EXAMINATION:  XR TIBIA FIBULA 2 VIEW RIGHT    CLINICAL HISTORY:  Other specified soft tissue disorders    FINDINGS:  Tib fib two views right.    No fracture, dislocation, or bone destruction seen.  No acute trauma seen.  No foreign body seen.  No acute trauma seen.                                       Medications   vancomycin 2 g in 0.9% sodium chloride 500 mL IVPB (0 mg Intravenous Stopped 7/14/25 1517)   iohexoL (OMNIPAQUE 350) injection 75 mL (75 mLs Intravenous Given 7/14/25 1354)   ceFEPIme injection 2 g (2 g Intravenous Given 7/14/25 1515)   clindamycin in D5W 600 mg/50 mL IVPB 600 mg (0 mg Intravenous Stopped 7/14/25 1623)     Medical Decision Making  Emergent evaluation of 39-year-old male who presents with a skin infection which is recurrent, despite antibiotic treatment.  Vital signs are benign, afebrile.  On exam he has a very large fluctuant area to the anterior right tibia with a small overlying abrasion.  I am concerned for underlying osteomyelitis, definitely evidence of cellulitis and abscess.  X-ray showed no acute process.  Labs do show elevated inflammatory markers and leukocytosis.  Care was turned over to mid shift at shift change awaiting CT results.  CT did show gas forming infection and patient was admitted to the hospital for OR washout and IV antibiotics.    Amount and/or Complexity of Data Reviewed  External Data Reviewed: notes.  Labs: ordered. Decision-making details  documented in ED Course.  Radiology: ordered and independent interpretation performed. Decision-making details documented in ED Course.    Risk  Prescription drug management.            Scribe Attestation:   Scribe #1: I performed the above scribed service and the documentation accurately describes the services I performed. I attest to the accuracy of the note.        ED Course as of 07/16/25 0731   Mon Jul 14, 2025   1354 Sedimentation rate(!)  Reviewed and significantly elevated. [AK]   1557 Assumed care of this patient from the previous provider, imaging consistent with an abscess in the right lower extremity, may have some minor gas formation.  On examination no overt crepitus, minor tenderness to palpation.  Discussed with on-call general surgeon Dr. Padilla, may require OR washout.  Added clindamycin given the gas formation and abscess.    Plan for observation at this time.  Discussed with Dr. Ruiz  [TK]      ED Course User Index  [AK] Pricilla Fraire MD  [TK] Rashad Mcallister MD           Physician Attestation for Scribe: I, Pricilla Fraire, reviewed documentation as scribed in my presence, which is both accurate and complete.       Medical Decision Making:   Differential Diagnosis:   Includes but not limited to cellulitis, abscess, osteomyelitis, necrotizing fasciitis, others                Clinical Impression:  Final diagnoses:  [M79.89] Leg swelling  [L03.115] Cellulitis of right lower extremity  [Z16.20] Therapy failure due to antibiotic resistance  [L02.91] Abscess          ED Disposition Condition    Observation              Launch MDCalc MDM  [facility]  Jul 16 2025 7:30 AM [Pricilla Fraire]  Data:  - Discussed with external professional: Case discussed with Shireen Ruiz MD or a provider/trainee on their team. See MDM section and/or ED Course for additional details on the discussion.  - Independent interpretation: I independently reviewed the XR Tib+Fib-R 2V. See MDM section and/or ED  Course for my interpretation. [Pricilla Fraire]  - Test/documents/historian: 3+ tests ordered  Problems: Chest pain  Additional encounter diagnoses: Leg swelling, Cellulitis of right lower extremity, Therapy failure due to antibiotic resistance, Abscess, Cellulitis and abscess of right leg  Risk: vancomycin in sodium chloride IVPB (Require intensive monitoring), Admitted (Decision regarding hospitalization)             [1]   Social History  Tobacco Use    Smoking status: Former     Current packs/day: 0.00     Types: Cigarettes     Quit date: 2019     Years since quittin.1    Smokeless tobacco: Never   Substance Use Topics    Alcohol use: Yes     Comment: 3-5 drinks per day    Drug use: Yes     Types: Marijuana        Pricilla Fraire MD  25 [id number]

## 2025-07-14 NOTE — ED TRIAGE NOTES
Wound to right shin with large raised, warm area for 3 days. Abx completed 2 weeks ago. Tetanus up-to-date.

## 2025-07-14 NOTE — PHARMACY MED REC
"      Admission Medication History     The home medication history was taken by Lisa Chand.    You may go to "Admission" then "Reconcile Home Medications" tabs to review and/or act upon these items.     The home medication list has been updated by the Pharmacy department.   Please read ALL comments highlighted in yellow.   Please address this information as you see fit.    Feel free to contact us if you have any questions or require assistance.      The patient was able to verbally verify medications at bedside.  "

## 2025-07-14 NOTE — CONSULTS
[facility] Emergency Dept  General Surgery  Consult Note    Patient Name: Mike Yadav  MRN: [id number]  Code Status: Full Code  Admission Date: 2025  Hospital Length of Stay: 0 days  Attending Physician: Shireen Ruiz MD  Primary Care Provider: [facility] Primary Doctor    Patient information was obtained from patient and ER records.     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    Patient states it is somewhat healed temporarily and then he started to drain again and it began to to get worse again.      It has gotten severely red and swollen over the past several days and patient states he had some fevers and chills last night.    Came to the emergency department for evaluation and treatment.    Patient found to have an anterior shin abscess       No current facility-administered medications on file prior to encounter.     Current Outpatient Medications on File Prior to Encounter   Medication Sig    EScitalopram oxalate (LEXAPRO) 10 MG tablet Take 10 mg by mouth once daily.       Review of patient's allergies indicates:   Allergen Reactions    Amoxicillin Hives       Past Medical History:   Diagnosis Date    Concussion 2019     Past Surgical History:   Procedure Laterality Date    LEG SURGERY Bilateral     R femur fx and L tib/fib fx repair    NOSE SURGERY      Fracture repair in 2019     Family History       Problem Relation (Age of Onset)    Multiple sclerosis Paternal Uncle    No Known Problems Mother, Father, Sister          Tobacco Use    Smoking status: Former     Current packs/day: 0.00     Types: Cigarettes     Quit date: 2019     Years since quittin.1    Smokeless tobacco: Never   Substance and Sexual Activity    Alcohol use: Yes     Comment: 3-5 drinks per day    Drug use: Yes     Types: Marijuana    Sexual activity: Not  on file     Review of Systems   Constitutional:  Negative for appetite change, fatigue, fever and unexpected weight change.   HENT:  Negative for sore throat and trouble swallowing.    Eyes: Negative.    Respiratory:  Negative for cough, shortness of breath and wheezing.    Cardiovascular:  Negative for chest pain and leg swelling.   Gastrointestinal:  Negative for abdominal distention, abdominal pain, blood in stool, constipation, diarrhea, nausea and vomiting.   Endocrine: Negative.    Genitourinary: Negative.    Musculoskeletal:  Negative for back pain.   Skin:  Positive for color change and wound (Anterior right shin). Negative for rash.   Allergic/Immunologic: Negative.    Neurological: Negative.    Hematological: Negative.    Psychiatric/Behavioral:  Negative for confusion.      Objective:     Vital Signs (Most Recent):  Temp: 98.1 °F (36.7 °C) (07/14/25 1106)  Pulse: 72 (07/14/25 1333)  Resp: 18 (07/14/25 1333)  BP: 133/79 (07/14/25 1600)  SpO2: 100 % (07/14/25 1333) Vital Signs (24h Range):  Temp:  [98.1 °F (36.7 °C)] 98.1 °F (36.7 °C)  Pulse:  [72-73] 72  Resp:  [18] 18  SpO2:  [98 %-100 %] 100 %  BP: (133-135)/(77-79) 133/79     Weight: 79.4 kg (175 lb)  Body mass index is 25.84 kg/m².     Physical Exam  Vitals and nursing note reviewed.   Constitutional:       Appearance: He is well-developed.   HENT:      Head: Normocephalic and atraumatic.   Cardiovascular:      Rate and Rhythm: Normal rate.      Heart sounds: Normal heart sounds.   Pulmonary:      Effort: Pulmonary effort is normal.   Abdominal:      General: Bowel sounds are normal. There is no distension.      Palpations: Abdomen is soft.      Tenderness: There is no abdominal tenderness.   Musculoskeletal:         General: Normal range of motion.      Cervical back: Normal range of motion.   Skin:     General: Skin is warm and dry.      Capillary Refill: Capillary refill takes less than 2 seconds.             Comments: 7 x 5 x 2 cm indurated,  erythematous, tender to right shin   Neurological:      Mental Status: He is alert and oriented to person, place, and time.   Psychiatric:         Behavior: Behavior normal.            I have reviewed all pertinent lab results within the past 24 hours.  CBC:   Recent Labs   Lab 07/14/25  1226   WBC 13.31*   RBC 4.20*   HGB 13.0*   HCT 39.7*      MCV 95   MCH 31.0   MCHC 32.7     CMP:   Recent Labs   Lab 07/14/25  1226   GLU 89   CALCIUM 9.3   ALBUMIN 4.0   PROT 8.2      K 3.8   CO2 25      BUN 11   CREATININE 0.8   ALKPHOS 94   ALT 15   AST 21   BILITOT 0.7       Significant Diagnostics:  I have reviewed all pertinent imaging results/findings within the past 24 hours.  CT: I have reviewed all pertinent results/findings within the past 24 hours and my personal findings are:  Anterior shin abscess, infected hematoma    Assessment/Plan:     * Cellulitis and abscess of right leg  To OR tomorrow for I and D   All risks and benefits discussed         VTE Risk Mitigation (From admission, onward)           Ordered     enoxaparin injection 40 mg  Daily         07/14/25 1650     IP VTE HIGH RISK PATIENT  Once         07/14/25 1650     Place sequential compression device  Until discontinued         07/14/25 1650                    Thank you for your consult. I will follow-up with patient. Please contact us if you have any additional questions.    Rashad Robles MD  General Surgery  [facility] - Emergency Dept

## 2025-07-14 NOTE — SUBJECTIVE & OBJECTIVE
Past Medical History:   Diagnosis Date    Concussion 2019       Past Surgical History:   Procedure Laterality Date    LEG SURGERY Bilateral     R femur fx and L tib/fib fx repair    NOSE SURGERY      Fracture repair in 2019       Review of patient's allergies indicates:   Allergen Reactions    Amoxicillin Hives       No current facility-administered medications on file prior to encounter.     Current Outpatient Medications on File Prior to Encounter   Medication Sig    EScitalopram oxalate (LEXAPRO) 10 MG tablet Take 10 mg by mouth once daily.     Family History       Problem Relation (Age of Onset)    Multiple sclerosis Paternal Uncle    No Known Problems Mother, Father, Sister          Tobacco Use    Smoking status: Former     Current packs/day: 0.00     Types: Cigarettes     Quit date: 2019     Years since quittin.1    Smokeless tobacco: Never   Substance and Sexual Activity    Alcohol use: Yes     Comment: 3-5 drinks per day    Drug use: Yes     Types: Marijuana    Sexual activity: Not on file     Review of Systems   Constitutional:  Positive for chills and fever. Negative for diaphoresis.   HENT:  Negative for sore throat.    Eyes:  Negative for visual disturbance.   Respiratory:  Negative for cough and shortness of breath.    Cardiovascular:  Negative for chest pain.   Gastrointestinal:  Positive for vomiting. Negative for abdominal pain, constipation, diarrhea and nausea.   Endocrine: Negative for polyuria.   Genitourinary:  Negative for dysuria.   Musculoskeletal:  Positive for myalgias. Negative for neck pain and neck stiffness.   Skin:  Positive for color change, rash and wound.   Neurological:  Negative for syncope, light-headedness and headaches.   Hematological:  Does not bruise/bleed easily.   Psychiatric/Behavioral:  Negative for confusion.      Objective:     Vital Signs (Most Recent):  Temp: 98.1 °F (36.7 °C) (25 1106)  Pulse: 72 (25 1333)  Resp: 18 (25 1333)  BP: 133/79  (07/14/25 1600)  SpO2: 100 % (07/14/25 1333) Vital Signs (24h Range):  Temp:  [98.1 °F (36.7 °C)] 98.1 °F (36.7 °C)  Pulse:  [72-73] 72  Resp:  [18] 18  SpO2:  [98 %-100 %] 100 %  BP: (133-135)/(77-79) 133/79     Weight: 79.4 kg (175 lb)  Body mass index is 25.84 kg/m².     Physical Exam  Vitals and nursing note reviewed.   Constitutional:       General: He is not in acute distress.     Appearance: He is normal weight. He is not ill-appearing, toxic-appearing or diaphoretic.      Comments: Athletic build   HENT:      Head: Normocephalic and atraumatic.      Ears:      Comments: Cauliflower ears     Nose:      Comments: Deviated nose bridge  Eyes:      Extraocular Movements: Extraocular movements intact.      Conjunctiva/sclera: Conjunctivae normal.   Cardiovascular:      Rate and Rhythm: Regular rhythm. Bradycardia present.   Pulmonary:      Effort: Pulmonary effort is normal. No respiratory distress.      Breath sounds: Normal breath sounds. No wheezing or rhonchi.   Abdominal:      General: Bowel sounds are normal. There is no distension.      Tenderness: There is no abdominal tenderness. There is no guarding or rebound.   Musculoskeletal:         General: Swelling present. Normal range of motion.      Cervical back: Normal range of motion and neck supple.      Right lower leg: Edema present.   Skin:     Capillary Refill: Capillary refill takes less than 2 seconds.      Comments: Right anterior leg with erythema, edema, increased warmth, +fluctuance and tenderness to palpation (see photo)   Neurological:      Mental Status: He is alert and oriented to person, place, and time.   Psychiatric:         Mood and Affect: Mood normal.         Behavior: Behavior normal.         Thought Content: Thought content normal.                    Significant Labs: All pertinent labs within the past 24 hours have been reviewed.    Significant Imaging: I have reviewed all pertinent imaging results/findings within the past 24 hours.

## 2025-07-14 NOTE — PROGRESS NOTES
"Pharmacokinetic Initial Assessment: IV Vancomycin    Assessment/Plan:    Initiate intravenous vancomycin with loading dose of 2000 mg once followed by a maintenance dose of vancomycin 1250 mg IV every 12 hours  Desired empiric serum trough concentration is 10 to 20 mcg/mL  Draw vancomycin trough level 60 min prior to fourth dose on 7/16/2025 at approximately 00:00  Pharmacy will continue to follow and monitor vancomycin.      Please contact pharmacy at extension [phone] with any questions regarding this assessment.     Thank you for the consult,   Dianne Armenta       Patient brief summary:  Mike Yadav is a 39 y.o. male initiated on antimicrobial therapy with IV Vancomycin for treatment of suspected skin & soft tissue infection    Drug Allergies:   Review of patient's allergies indicates:   Allergen Reactions    Amoxicillin Hives       Actual Body Weight:   79.4 kg    Renal Function:   Estimated Creatinine Clearance: 126.4 mL/min (based on SCr of 0.8 mg/dL).,     Dialysis Method (if applicable):  N/A    CBC (last 72 hours):  Recent Labs   Lab Result Units 07/14/25  1226   WBC K/uL 13.31*   HGB gm/dL 13.0*   HCT % 39.7*   Platelet Count K/uL 332   Lymph % % 8.8*   Mono % % 7.4   Eos % % 1.1   Basophil % % 0.5       Metabolic Panel (last 72 hours):  Recent Labs   Lab Result Units 07/14/25  1226   Sodium mmol/L 137   Potassium mmol/L 3.8   Chloride mmol/L 101   CO2 mmol/L 25   Glucose mg/dL 89   BUN mg/dL 11   Creatinine mg/dL 0.8   Albumin g/dL 4.0   Bilirubin Total mg/dL 0.7   ALP unit/L 94   AST unit/L 21   ALT unit/L 15       Drug levels (last 3 results):  No results for input(s): "VANCOMYCINRA", "VANCORANDOM", "VANCOMYCINPE", "VANCOPEAK", "VANCOMYCINTR", "VANCOTROUGH" in the last 72 hours.    Microbiologic Results:  Microbiology Results (last 7 days)       ** No results found for the last 168 hours. **            "

## 2025-07-14 NOTE — HPI
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    Patient states it is somewhat healed temporarily and then he started to drain again and it began to to get worse again.      It has gotten severely red and swollen over the past several days and patient states he had some fevers and chills last night.    Came to the emergency department for evaluation and treatment.    Patient found to have an anterior shin abscess

## 2025-07-14 NOTE — SUBJECTIVE & OBJECTIVE
No current facility-administered medications on file prior to encounter.     Current Outpatient Medications on File Prior to Encounter   Medication Sig    EScitalopram oxalate (LEXAPRO) 10 MG tablet Take 10 mg by mouth once daily.       Review of patient's allergies indicates:   Allergen Reactions    Amoxicillin Hives       Past Medical History:   Diagnosis Date    Concussion      Past Surgical History:   Procedure Laterality Date    LEG SURGERY Bilateral     R femur fx and L tib/fib fx repair    NOSE SURGERY      Fracture repair in 2019     Family History       Problem Relation (Age of Onset)    Multiple sclerosis Paternal Uncle    No Known Problems Mother, Father, Sister          Tobacco Use    Smoking status: Former     Current packs/day: 0.00     Types: Cigarettes     Quit date: 2019     Years since quittin.1    Smokeless tobacco: Never   Substance and Sexual Activity    Alcohol use: Yes     Comment: 3-5 drinks per day    Drug use: Yes     Types: Marijuana    Sexual activity: Not on file     Review of Systems   Constitutional:  Negative for appetite change, fatigue, fever and unexpected weight change.   HENT:  Negative for sore throat and trouble swallowing.    Eyes: Negative.    Respiratory:  Negative for cough, shortness of breath and wheezing.    Cardiovascular:  Negative for chest pain and leg swelling.   Gastrointestinal:  Negative for abdominal distention, abdominal pain, blood in stool, constipation, diarrhea, nausea and vomiting.   Endocrine: Negative.    Genitourinary: Negative.    Musculoskeletal:  Negative for back pain.   Skin:  Positive for color change and wound (Anterior right shin). Negative for rash.   Allergic/Immunologic: Negative.    Neurological: Negative.    Hematological: Negative.    Psychiatric/Behavioral:  Negative for confusion.      Objective:     Vital Signs (Most Recent):  Temp: 98.1 °F (36.7 °C) (25 1106)  Pulse: 72 (25 1333)  Resp: 18 (25 1333)  BP:  133/79 (07/14/25 1600)  SpO2: 100 % (07/14/25 1333) Vital Signs (24h Range):  Temp:  [98.1 °F (36.7 °C)] 98.1 °F (36.7 °C)  Pulse:  [72-73] 72  Resp:  [18] 18  SpO2:  [98 %-100 %] 100 %  BP: (133-135)/(77-79) 133/79     Weight: 79.4 kg (175 lb)  Body mass index is 25.84 kg/m².     Physical Exam  Vitals and nursing note reviewed.   Constitutional:       Appearance: He is well-developed.   HENT:      Head: Normocephalic and atraumatic.   Cardiovascular:      Rate and Rhythm: Normal rate.      Heart sounds: Normal heart sounds.   Pulmonary:      Effort: Pulmonary effort is normal.   Abdominal:      General: Bowel sounds are normal. There is no distension.      Palpations: Abdomen is soft.      Tenderness: There is no abdominal tenderness.   Musculoskeletal:         General: Normal range of motion.      Cervical back: Normal range of motion.   Skin:     General: Skin is warm and dry.      Capillary Refill: Capillary refill takes less than 2 seconds.             Comments: 7 x 5 x 2 cm indurated, erythematous, tender to right shin   Neurological:      Mental Status: He is alert and oriented to person, place, and time.   Psychiatric:         Behavior: Behavior normal.            I have reviewed all pertinent lab results within the past 24 hours.  CBC:   Recent Labs   Lab 07/14/25  1226   WBC 13.31*   RBC 4.20*   HGB 13.0*   HCT 39.7*      MCV 95   MCH 31.0   MCHC 32.7     CMP:   Recent Labs   Lab 07/14/25  1226   GLU 89   CALCIUM 9.3   ALBUMIN 4.0   PROT 8.2      K 3.8   CO2 25      BUN 11   CREATININE 0.8   ALKPHOS 94   ALT 15   AST 21   BILITOT 0.7       Significant Diagnostics:  I have reviewed all pertinent imaging results/findings within the past 24 hours.  CT: I have reviewed all pertinent results/findings within the past 24 hours and my personal findings are:  Anterior shin abscess, infected hematoma

## 2025-07-14 NOTE — H&P (VIEW-ONLY)
[facility] Emergency Dept  General Surgery  Consult Note    Patient Name: Mike Yadav  MRN: [id number]  Code Status: Full Code  Admission Date: 2025  Hospital Length of Stay: 0 days  Attending Physician: Shireen Ruiz MD  Primary Care Provider: [facility] Primary Doctor    Patient information was obtained from patient and ER records.     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    Patient states it is somewhat healed temporarily and then he started to drain again and it began to to get worse again.      It has gotten severely red and swollen over the past several days and patient states he had some fevers and chills last night.    Came to the emergency department for evaluation and treatment.    Patient found to have an anterior shin abscess       No current facility-administered medications on file prior to encounter.     Current Outpatient Medications on File Prior to Encounter   Medication Sig    EScitalopram oxalate (LEXAPRO) 10 MG tablet Take 10 mg by mouth once daily.       Review of patient's allergies indicates:   Allergen Reactions    Amoxicillin Hives       Past Medical History:   Diagnosis Date    Concussion 2019     Past Surgical History:   Procedure Laterality Date    LEG SURGERY Bilateral     R femur fx and L tib/fib fx repair    NOSE SURGERY      Fracture repair in 2019     Family History       Problem Relation (Age of Onset)    Multiple sclerosis Paternal Uncle    No Known Problems Mother, Father, Sister          Tobacco Use    Smoking status: Former     Current packs/day: 0.00     Types: Cigarettes     Quit date: 2019     Years since quittin.1    Smokeless tobacco: Never   Substance and Sexual Activity    Alcohol use: Yes     Comment: 3-5 drinks per day    Drug use: Yes     Types: Marijuana    Sexual activity: Not  on file     Review of Systems   Constitutional:  Negative for appetite change, fatigue, fever and unexpected weight change.   HENT:  Negative for sore throat and trouble swallowing.    Eyes: Negative.    Respiratory:  Negative for cough, shortness of breath and wheezing.    Cardiovascular:  Negative for chest pain and leg swelling.   Gastrointestinal:  Negative for abdominal distention, abdominal pain, blood in stool, constipation, diarrhea, nausea and vomiting.   Endocrine: Negative.    Genitourinary: Negative.    Musculoskeletal:  Negative for back pain.   Skin:  Positive for color change and wound (Anterior right shin). Negative for rash.   Allergic/Immunologic: Negative.    Neurological: Negative.    Hematological: Negative.    Psychiatric/Behavioral:  Negative for confusion.      Objective:     Vital Signs (Most Recent):  Temp: 98.1 °F (36.7 °C) (07/14/25 1106)  Pulse: 72 (07/14/25 1333)  Resp: 18 (07/14/25 1333)  BP: 133/79 (07/14/25 1600)  SpO2: 100 % (07/14/25 1333) Vital Signs (24h Range):  Temp:  [98.1 °F (36.7 °C)] 98.1 °F (36.7 °C)  Pulse:  [72-73] 72  Resp:  [18] 18  SpO2:  [98 %-100 %] 100 %  BP: (133-135)/(77-79) 133/79     Weight: 79.4 kg (175 lb)  Body mass index is 25.84 kg/m².     Physical Exam  Vitals and nursing note reviewed.   Constitutional:       Appearance: He is well-developed.   HENT:      Head: Normocephalic and atraumatic.   Cardiovascular:      Rate and Rhythm: Normal rate.      Heart sounds: Normal heart sounds.   Pulmonary:      Effort: Pulmonary effort is normal.   Abdominal:      General: Bowel sounds are normal. There is no distension.      Palpations: Abdomen is soft.      Tenderness: There is no abdominal tenderness.   Musculoskeletal:         General: Normal range of motion.      Cervical back: Normal range of motion.   Skin:     General: Skin is warm and dry.      Capillary Refill: Capillary refill takes less than 2 seconds.             Comments: 7 x 5 x 2 cm indurated,  erythematous, tender to right shin   Neurological:      Mental Status: He is alert and oriented to person, place, and time.   Psychiatric:         Behavior: Behavior normal.            I have reviewed all pertinent lab results within the past 24 hours.  CBC:   Recent Labs   Lab 07/14/25  1226   WBC 13.31*   RBC 4.20*   HGB 13.0*   HCT 39.7*      MCV 95   MCH 31.0   MCHC 32.7     CMP:   Recent Labs   Lab 07/14/25  1226   GLU 89   CALCIUM 9.3   ALBUMIN 4.0   PROT 8.2      K 3.8   CO2 25      BUN 11   CREATININE 0.8   ALKPHOS 94   ALT 15   AST 21   BILITOT 0.7       Significant Diagnostics:  I have reviewed all pertinent imaging results/findings within the past 24 hours.  CT: I have reviewed all pertinent results/findings within the past 24 hours and my personal findings are:  Anterior shin abscess, infected hematoma    Assessment/Plan:     * Cellulitis and abscess of right leg  To OR tomorrow for I and D   All risks and benefits discussed         VTE Risk Mitigation (From admission, onward)           Ordered     enoxaparin injection 40 mg  Daily         07/14/25 1650     IP VTE HIGH RISK PATIENT  Once         07/14/25 1650     Place sequential compression device  Until discontinued         07/14/25 1650                    Thank you for your consult. I will follow-up with patient. Please contact us if you have any additional questions.    Rashad Robles MD  General Surgery  [facility] - Emergency Dept

## 2025-07-14 NOTE — HOSPITAL COURSE
Discussed with the patient findings   No longer having fever and chills   Erythema has starting to regress from when he arrived.      Still having pain and tenderness.    CT scan shows a 7 x 4 x 2 cm anterior shin abscess

## 2025-07-14 NOTE — H&P
[facility] Emergency [facility] Medicine  History & Physical    Patient Name: Mike Yadav  MRN: [id number]  Patient Class: OP- Observation  Admission Date: 7/14/2025  Attending Physician: Shireen Ruiz MD   Primary Care Provider: [facility] Primary Doctor         Patient information was obtained from patient, past medical records, and ER records.     Subjective:     Principal Problem:Cellulitis and abscess of right leg    Chief Complaint:   Chief Complaint   Patient presents with    Leg Pain     Pt. Present to the Ed with right leg pain. Pt. Admits to kicking a stop sign 2 months ago and cutting his leg. Pt. Was taking antibiotics for 15 days an it was getting better. Now it's  swollen ,red an hot to touch. Pt. Is alert an all vitals are within normal limits. GSC-15        HPI: 40 y/o male with no significant PMHx who presented with c/o right leg pain, swelling and redness increasing for 2-3 days and start of fever/chill last night. He reported getting a cut to his right leg 2 months ago while kicking a stop sign. He cleaned and treated with wound at home and it seemed to have self resolved. 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 Then about 2 weeks ago, he noticed increasing redness and pain of his right leg around the same area. This time, he went to an Urgent Care where he was given antibiotics with improvement - he stated he got an IM injection of antibiotics x 1 and was discharged on a course of doxycycline and topical Bactroban ointment with resolution of redness. He stopped training while the wound healed but resumed training again after he finished his antibiotics and after it appeared to have resolved. He again presents with redness and tenderness to palpation, no reported drainage to that site, but markedly worse that previous times, and increased pain with movement and touch. In the ER, workup with CT of right leg  "showed "no evidence of osteomyelitis. Fluid collection with rim enhancement and small gas bubbles anterior to the distal tibia concerning for an abscess collection. Generalized edema and fluid tracking within the soft tissues of the right leg."    Past Medical History:   Diagnosis Date    Concussion        Past Surgical History:   Procedure Laterality Date    LEG SURGERY Bilateral     R femur fx and L tib/fib fx repair    NOSE SURGERY      Fracture repair in 2019       Review of patient's allergies indicates:   Allergen Reactions    Amoxicillin Hives       No current facility-administered medications on file prior to encounter.     Current Outpatient Medications on File Prior to Encounter   Medication Sig    EScitalopram oxalate (LEXAPRO) 10 MG tablet Take 10 mg by mouth once daily.     Family History       Problem Relation (Age of Onset)    Multiple sclerosis Paternal Uncle    No Known Problems Mother, Father, Sister          Tobacco Use    Smoking status: Former     Current packs/day: 0.00     Types: Cigarettes     Quit date: 2019     Years since quittin.1    Smokeless tobacco: Never   Substance and Sexual Activity    Alcohol use: Yes     Comment: 3-5 drinks per day    Drug use: Yes     Types: Marijuana    Sexual activity: Not on file     Review of Systems   Constitutional:  Positive for chills and fever. Negative for diaphoresis.   HENT:  Negative for sore throat.    Eyes:  Negative for visual disturbance.   Respiratory:  Negative for cough and shortness of breath.    Cardiovascular:  Negative for chest pain.   Gastrointestinal:  Positive for vomiting. Negative for abdominal pain, constipation, diarrhea and nausea.   Endocrine: Negative for polyuria.   Genitourinary:  Negative for dysuria.   Musculoskeletal:  Positive for myalgias. Negative for neck pain and neck stiffness.   Skin:  Positive for color change, rash and wound.   Neurological:  Negative for syncope, light-headedness and headaches. "   Hematological:  Does not bruise/bleed easily.   Psychiatric/Behavioral:  Negative for confusion.      Objective:     Vital Signs (Most Recent):  Temp: 98.1 °F (36.7 °C) (07/14/25 1106)  Pulse: 72 (07/14/25 1333)  Resp: 18 (07/14/25 1333)  BP: 133/79 (07/14/25 1600)  SpO2: 100 % (07/14/25 1333) Vital Signs (24h Range):  Temp:  [98.1 °F (36.7 °C)] 98.1 °F (36.7 °C)  Pulse:  [72-73] 72  Resp:  [18] 18  SpO2:  [98 %-100 %] 100 %  BP: (133-135)/(77-79) 133/79     Weight: 79.4 kg (175 lb)  Body mass index is 25.84 kg/m².     Physical Exam  Vitals and nursing note reviewed.   Constitutional:       General: He is not in acute distress.     Appearance: He is normal weight. He is not ill-appearing, toxic-appearing or diaphoretic.      Comments: Athletic build   HENT:      Head: Normocephalic and atraumatic.      Ears:      Comments: Cauliflower ears     Nose:      Comments: Deviated nose bridge  Eyes:      Extraocular Movements: Extraocular movements intact.      Conjunctiva/sclera: Conjunctivae normal.   Cardiovascular:      Rate and Rhythm: Regular rhythm. Bradycardia present.   Pulmonary:      Effort: Pulmonary effort is normal. No respiratory distress.      Breath sounds: Normal breath sounds. No wheezing or rhonchi.   Abdominal:      General: Bowel sounds are normal. There is no distension.      Tenderness: There is no abdominal tenderness. There is no guarding or rebound.   Musculoskeletal:         General: Swelling present. Normal range of motion.      Cervical back: Normal range of motion and neck supple.      Right lower leg: Edema present.   Skin:     Capillary Refill: Capillary refill takes less than 2 seconds.      Comments: Right anterior leg with erythema, edema, increased warmth, +fluctuance and tenderness to palpation (see photo)   Neurological:      Mental Status: He is alert and oriented to person, place, and time.   Psychiatric:         Mood and Affect: Mood normal.         Behavior: Behavior normal.     "     Thought Content: Thought content normal.                    Significant Labs: All pertinent labs within the past 24 hours have been reviewed.    Significant Imaging: I have reviewed all pertinent imaging results/findings within the past 24 hours.  Assessment/Plan:     Assessment & Plan  Cellulitis and abscess of right leg  - Presented with exam findings consistent with abscess and cellulitis and workup with CT of right leg showed "no evidence of osteomyelitis. Fluid collection with rim enhancement and small gas bubbles anterior to the distal tibia concerning for an abscess collection. Generalized edema and fluid tracking within the soft tissues of the right leg."  - Given cefepime, clindamycin and vancomycin in the ER. Presentation not consistent with sepsis. Not a treatment failure of PO antibiotics given resolution with treatment, but only returned after patient engaged in training again   - Will obtain blood cultures now  - Case discussed with General surgery, Dr. Robles, and he will take patient to the OR tomorrow for abscess drainage. Will make NPO after MN.   - Will continue ceftriaxone and vancomycin for continued antibiotic treatment.   - Pain control and supportive care  - Trend with labs daily and check PT/INR    VTE Risk Mitigation (From admission, onward)           Ordered     enoxaparin injection 40 mg  Daily         07/14/25 1650     IP VTE HIGH RISK PATIENT  Once         07/14/25 1650     Place sequential compression device  Until discontinued         07/14/25 1650                       On 07/14/2025, patient should be placed in hospital observation services under my care.    Shireen Ruiz MD  Department of Hospital Medicine  [facility] - Emergency Dept          "

## 2025-07-14 NOTE — ASSESSMENT & PLAN NOTE
"- Presented with exam findings consistent with abscess and cellulitis and workup with CT of right leg showed "no evidence of osteomyelitis. Fluid collection with rim enhancement and small gas bubbles anterior to the distal tibia concerning for an abscess collection. Generalized edema and fluid tracking within the soft tissues of the right leg."  - Given cefepime, clindamycin and vancomycin in the ER. Presentation not consistent with sepsis. Not a treatment failure of PO antibiotics given resolution with treatment, but only returned after patient engaged in training again   - Will obtain blood cultures now  - Case discussed with General surgery, Dr. Robles, and he will take patient to the OR tomorrow for abscess drainage. Will make NPO after MN.   - Will continue ceftriaxone and vancomycin for continued antibiotic treatment.   - Pain control and supportive care  - Trend with labs daily and check PT/INR  "

## 2025-07-14 NOTE — HPI
"38 y/o male with no significant PMHx who presented with c/o right leg pain, swelling and redness increasing for 2-3 days and start of fever/chill last night. He reported getting a cut to his right leg 2 months ago while kicking a stop sign. He cleaned and treated with wound at home and it seemed to have self resolved. 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 Then about 2 weeks ago, he noticed increasing redness and pain of his right leg around the same area. This time, he went to an Urgent Care where he was given antibiotics with improvement - he stated he got an IM injection of antibiotics x 1 and was discharged on a course of doxycycline and topical Bactroban ointment with resolution of redness. He stopped training while the wound healed but resumed training again after he finished his antibiotics and after it appeared to have resolved. He again presents with redness and tenderness to palpation, no reported drainage to that site, but markedly worse that previous times, and increased pain with movement and touch. In the ER, workup with CT of right leg showed "no evidence of osteomyelitis. Fluid collection with rim enhancement and small gas bubbles anterior to the distal tibia concerning for an abscess collection. Generalized edema and fluid tracking within the soft tissues of the right leg."  "

## 2025-07-15 ENCOUNTER — ANESTHESIA (OUTPATIENT)
Dept: SURGERY | Facility: OTHER | Age: 40
End: 2025-07-15
Payer: COMMERCIAL

## 2025-07-15 ENCOUNTER — ANESTHESIA EVENT (OUTPATIENT)
Dept: SURGERY | Facility: OTHER | Age: 40
End: 2025-07-15
Payer: COMMERCIAL

## 2025-07-15 LAB
ABSOLUTE EOSINOPHIL (OHS): 0.32 K/UL
ABSOLUTE MONOCYTE (OHS): 1.1 K/UL (ref 0.3–1)
ABSOLUTE NEUTROPHIL COUNT (OHS): 10.33 K/UL (ref 1.8–7.7)
ALBUMIN SERPL BCP-MCNC: 3.3 G/DL (ref 3.5–5.2)
ALP SERPL-CCNC: 88 UNIT/L (ref 40–150)
ALT SERPL W/O P-5'-P-CCNC: 10 UNIT/L (ref 10–44)
ANION GAP (OHS): 13 MMOL/L (ref 8–16)
AST SERPL-CCNC: 14 UNIT/L (ref 11–45)
BASOPHILS # BLD AUTO: 0.07 K/UL
BASOPHILS NFR BLD AUTO: 0.5 %
BILIRUB SERPL-MCNC: 0.8 MG/DL (ref 0.1–1)
BUN SERPL-MCNC: 10 MG/DL (ref 6–20)
CALCIUM SERPL-MCNC: 8.8 MG/DL (ref 8.7–10.5)
CHLORIDE SERPL-SCNC: 104 MMOL/L (ref 95–110)
CO2 SERPL-SCNC: 21 MMOL/L (ref 23–29)
CREAT SERPL-MCNC: 0.7 MG/DL (ref 0.5–1.4)
ERYTHROCYTE [DISTWIDTH] IN BLOOD BY AUTOMATED COUNT: 12.7 % (ref 11.5–14.5)
GFR SERPLBLD CREATININE-BSD FMLA CKD-EPI: >60 ML/MIN/1.73/M2
GLUCOSE SERPL-MCNC: 101 MG/DL (ref 70–110)
HCT VFR BLD AUTO: 37.5 % (ref 40–54)
HGB BLD-MCNC: 12.5 GM/DL (ref 14–18)
IMM GRANULOCYTES # BLD AUTO: 0.05 K/UL (ref 0–0.04)
IMM GRANULOCYTES NFR BLD AUTO: 0.4 % (ref 0–0.5)
INR PPP: 0.9 (ref 0.8–1.2)
LYMPHOCYTES # BLD AUTO: 1.21 K/UL (ref 1–4.8)
MAGNESIUM SERPL-MCNC: 1.8 MG/DL (ref 1.6–2.6)
MCH RBC QN AUTO: 31.1 PG (ref 27–31)
MCHC RBC AUTO-ENTMCNC: 33.3 G/DL (ref 32–36)
MCV RBC AUTO: 93 FL (ref 82–98)
NUCLEATED RBC (/100WBC) (OHS): 0 /100 WBC
PHOSPHATE SERPL-MCNC: 3.7 MG/DL (ref 2.7–4.5)
PLATELET # BLD AUTO: 296 K/UL (ref 150–450)
PMV BLD AUTO: 8.9 FL (ref 9.2–12.9)
POTASSIUM SERPL-SCNC: 3.8 MMOL/L (ref 3.5–5.1)
PROT SERPL-MCNC: 7.1 GM/DL (ref 6–8.4)
PROTHROMBIN TIME: 10.6 SECONDS (ref 9–12.5)
RBC # BLD AUTO: 4.02 M/UL (ref 4.6–6.2)
RELATIVE EOSINOPHIL (OHS): 2.4 %
RELATIVE LYMPHOCYTE (OHS): 9.3 % (ref 18–48)
RELATIVE MONOCYTE (OHS): 8.4 % (ref 4–15)
RELATIVE NEUTROPHIL (OHS): 79 % (ref 38–73)
SODIUM SERPL-SCNC: 138 MMOL/L (ref 136–145)
WBC # BLD AUTO: 13.08 K/UL (ref 3.9–12.7)

## 2025-07-15 PROCEDURE — G0378 HOSPITAL OBSERVATION PER HR: HCPCS

## 2025-07-15 PROCEDURE — 36000704 HC OR TIME LEV I 1ST 15 MIN: Performed by: SURGERY

## 2025-07-15 PROCEDURE — 36415 COLL VENOUS BLD VENIPUNCTURE: CPT | Performed by: HOSPITALIST

## 2025-07-15 PROCEDURE — 85025 COMPLETE CBC W/AUTO DIFF WBC: CPT | Performed by: HOSPITALIST

## 2025-07-15 PROCEDURE — 87075 CULTR BACTERIA EXCEPT BLOOD: CPT | Performed by: SURGERY

## 2025-07-15 PROCEDURE — 11045 DBRDMT SUBQ TISS EACH ADDL: CPT | Mod: ,,, | Performed by: SURGERY

## 2025-07-15 PROCEDURE — C1729 CATH, DRAINAGE: HCPCS | Performed by: SURGERY

## 2025-07-15 PROCEDURE — 87186 SC STD MICRODIL/AGAR DIL: CPT | Performed by: SURGERY

## 2025-07-15 PROCEDURE — 80053 COMPREHEN METABOLIC PANEL: CPT | Performed by: HOSPITALIST

## 2025-07-15 PROCEDURE — 36000705 HC OR TIME LEV I EA ADD 15 MIN: Performed by: SURGERY

## 2025-07-15 PROCEDURE — 97165 OT EVAL LOW COMPLEX 30 MIN: CPT

## 2025-07-15 PROCEDURE — 63600175 PHARM REV CODE 636 W HCPCS: Performed by: SURGERY

## 2025-07-15 PROCEDURE — 63600175 PHARM REV CODE 636 W HCPCS: Performed by: HOSPITALIST

## 2025-07-15 PROCEDURE — 37000008 HC ANESTHESIA 1ST 15 MINUTES: Performed by: SURGERY

## 2025-07-15 PROCEDURE — 83735 ASSAY OF MAGNESIUM: CPT | Performed by: HOSPITALIST

## 2025-07-15 PROCEDURE — 96372 THER/PROPH/DIAG INJ SC/IM: CPT | Performed by: HOSPITALIST

## 2025-07-15 PROCEDURE — A4216 STERILE WATER/SALINE, 10 ML: HCPCS | Performed by: HOSPITALIST

## 2025-07-15 PROCEDURE — 84100 ASSAY OF PHOSPHORUS: CPT | Performed by: HOSPITALIST

## 2025-07-15 PROCEDURE — 94761 N-INVAS EAR/PLS OXIMETRY MLT: CPT

## 2025-07-15 PROCEDURE — 85610 PROTHROMBIN TIME: CPT | Performed by: HOSPITALIST

## 2025-07-15 PROCEDURE — 63600175 PHARM REV CODE 636 W HCPCS: Performed by: STUDENT IN AN ORGANIZED HEALTH CARE EDUCATION/TRAINING PROGRAM

## 2025-07-15 PROCEDURE — 10061 I&D ABSCESS COMP/MULTIPLE: CPT | Mod: ,,, | Performed by: SURGERY

## 2025-07-15 PROCEDURE — 37000009 HC ANESTHESIA EA ADD 15 MINS: Performed by: SURGERY

## 2025-07-15 PROCEDURE — 11042 DBRDMT SUBQ TIS 1ST 20SQCM/<: CPT | Mod: 51,,, | Performed by: SURGERY

## 2025-07-15 PROCEDURE — 25000003 PHARM REV CODE 250: Performed by: HOSPITALIST

## 2025-07-15 PROCEDURE — 25000003 PHARM REV CODE 250: Performed by: STUDENT IN AN ORGANIZED HEALTH CARE EDUCATION/TRAINING PROGRAM

## 2025-07-15 RX ORDER — PROPOFOL 10 MG/ML
VIAL (ML) INTRAVENOUS
Status: DISCONTINUED | OUTPATIENT
Start: 2025-07-15 | End: 2025-07-15

## 2025-07-15 RX ORDER — KETAMINE HCL IN 0.9 % NACL 50 MG/5 ML
SYRINGE (ML) INTRAVENOUS
Status: DISCONTINUED | OUTPATIENT
Start: 2025-07-15 | End: 2025-07-15

## 2025-07-15 RX ORDER — PROCHLORPERAZINE EDISYLATE 5 MG/ML
5 INJECTION INTRAMUSCULAR; INTRAVENOUS EVERY 30 MIN PRN
OUTPATIENT
Start: 2025-07-15

## 2025-07-15 RX ORDER — OXYCODONE HYDROCHLORIDE 5 MG/1
5 TABLET ORAL
Refills: 0 | OUTPATIENT
Start: 2025-07-15

## 2025-07-15 RX ORDER — HYDROMORPHONE HYDROCHLORIDE 2 MG/ML
0.4 INJECTION, SOLUTION INTRAMUSCULAR; INTRAVENOUS; SUBCUTANEOUS EVERY 5 MIN PRN
Refills: 0 | OUTPATIENT
Start: 2025-07-15

## 2025-07-15 RX ORDER — IBUPROFEN 200 MG
600-800 TABLET ORAL 3 TIMES DAILY PRN
COMMUNITY
Start: 2025-07-15 | End: 2025-07-25

## 2025-07-15 RX ORDER — FENTANYL CITRATE 50 UG/ML
INJECTION, SOLUTION INTRAMUSCULAR; INTRAVENOUS
Status: DISCONTINUED | OUTPATIENT
Start: 2025-07-15 | End: 2025-07-15

## 2025-07-15 RX ORDER — GLUCAGON 1 MG
1 KIT INJECTION
OUTPATIENT
Start: 2025-07-15

## 2025-07-15 RX ORDER — LIDOCAINE HYDROCHLORIDE 20 MG/ML
INJECTION INTRAVENOUS
Status: DISCONTINUED | OUTPATIENT
Start: 2025-07-15 | End: 2025-07-15

## 2025-07-15 RX ORDER — SULFAMETHOXAZOLE AND TRIMETHOPRIM 800; 160 MG/1; MG/1
1 TABLET ORAL 2 TIMES DAILY
Qty: 28 TABLET | Refills: 0 | Status: ON HOLD | OUTPATIENT
Start: 2025-07-15 | End: 2025-07-18 | Stop reason: HOSPADM

## 2025-07-15 RX ORDER — MIDAZOLAM HYDROCHLORIDE 1 MG/ML
INJECTION INTRAMUSCULAR; INTRAVENOUS
Status: DISCONTINUED | OUTPATIENT
Start: 2025-07-15 | End: 2025-07-15

## 2025-07-15 RX ORDER — LIDOCAINE HYDROCHLORIDE AND EPINEPHRINE 10; 10 UG/ML; MG/ML
INJECTION, SOLUTION INFILTRATION; PERINEURAL
Status: DISCONTINUED | OUTPATIENT
Start: 2025-07-15 | End: 2025-07-15 | Stop reason: HOSPADM

## 2025-07-15 RX ORDER — PROPOFOL 10 MG/ML
VIAL (ML) INTRAVENOUS CONTINUOUS PRN
Status: DISCONTINUED | OUTPATIENT
Start: 2025-07-15 | End: 2025-07-15

## 2025-07-15 RX ORDER — CEFDINIR 300 MG/1
300 CAPSULE ORAL 2 TIMES DAILY
Qty: 28 CAPSULE | Refills: 0 | Status: SHIPPED | OUTPATIENT
Start: 2025-07-15 | End: 2025-07-29

## 2025-07-15 RX ORDER — SODIUM CHLORIDE 0.9 % (FLUSH) 0.9 %
2 SYRINGE (ML) INJECTION ONCE
OUTPATIENT
Start: 2025-07-15 | End: 2025-07-15

## 2025-07-15 RX ORDER — ONDANSETRON HYDROCHLORIDE 2 MG/ML
4 INJECTION, SOLUTION INTRAVENOUS DAILY PRN
OUTPATIENT
Start: 2025-07-15

## 2025-07-15 RX ADMIN — PROPOFOL 150 MCG/KG/MIN: 10 INJECTION, EMULSION INTRAVENOUS at 11:07

## 2025-07-15 RX ADMIN — Medication 10 ML: at 01:07

## 2025-07-15 RX ADMIN — ENOXAPARIN SODIUM 40 MG: 40 INJECTION SUBCUTANEOUS at 05:07

## 2025-07-15 RX ADMIN — LIDOCAINE HYDROCHLORIDE 80 MG: 20 INJECTION, SOLUTION INTRAVENOUS at 11:07

## 2025-07-15 RX ADMIN — VANCOMYCIN HYDROCHLORIDE 1250 MG: 1.25 INJECTION, POWDER, LYOPHILIZED, FOR SOLUTION INTRAVENOUS at 12:07

## 2025-07-15 RX ADMIN — Medication 10 ML: at 05:07

## 2025-07-15 RX ADMIN — FENTANYL CITRATE 50 MCG: 50 INJECTION, SOLUTION INTRAMUSCULAR; INTRAVENOUS at 11:07

## 2025-07-15 RX ADMIN — Medication 20 MG: at 11:07

## 2025-07-15 RX ADMIN — MIDAZOLAM HYDROCHLORIDE 2 MG: 1 INJECTION, SOLUTION INTRAMUSCULAR; INTRAVENOUS at 11:07

## 2025-07-15 RX ADMIN — VANCOMYCIN HYDROCHLORIDE 1250 MG: 1.25 INJECTION, POWDER, LYOPHILIZED, FOR SOLUTION INTRAVENOUS at 01:07

## 2025-07-15 RX ADMIN — PROPOFOL 100 MG: 10 INJECTION, EMULSION INTRAVENOUS at 11:07

## 2025-07-15 RX ADMIN — CEFTRIAXONE 1 G: 1 INJECTION, POWDER, FOR SOLUTION INTRAMUSCULAR; INTRAVENOUS at 05:07

## 2025-07-15 RX ADMIN — SODIUM CHLORIDE: 0.9 INJECTION, SOLUTION INTRAVENOUS at 11:07

## 2025-07-15 RX ADMIN — Medication 30 MG: at 11:07

## 2025-07-15 NOTE — ANESTHESIA PREPROCEDURE EVALUATION
07/15/2025  Mike Yadav is a 39 y.o., male.      Pre-op Assessment    I have reviewed the Patient Summary Reports.     I have reviewed the Nursing Notes. I have reviewed the NPO Status.   I have reviewed the Medications.     Review of Systems  Anesthesia Hx:  No problems with previous Anesthesia                Cardiovascular:  Exercise tolerance: good                                               Physical Exam  General: Well nourished    Airway:  Mallampati: I     Dental:  Intact    Anesthesia Plan  Type of Anesthesia, risks & benefits discussed:    Anesthesia Type: Gen Supraglottic Airway, MAC  Intra-op Monitoring Plan: Standard ASA Monitors  Post Op Pain Control Plan: IV/PO Opioids PRN  Induction:  IV  Informed Consent: Informed consent signed with the Patient and all parties understand the risks and agree with anesthesia plan.  All questions answered.   ASA Score: 1    Ready For Surgery From Anesthesia Perspective.   .

## 2025-07-15 NOTE — PT/OT/SLP EVAL
Occupational Therapy   Evaluation and Discharge Note    Name: Mike Yadav  MRN: [id number]  Admitting Diagnosis: Cellulitis and abscess of right leg  Recent Surgery: Procedure(s) (LRB):  INCISION AND DRAINAGE, ABSCESS (Right) * Day of Surgery *    Recommendations:     Discharge Recommendations: No Therapy Indicated  Discharge Equipment Recommendations: none  Barriers to discharge:  None    Assessment:     Mike Yadav is a 39 y.o. male with a medical diagnosis of Cellulitis and abscess of right leg. At this time, patient is functioning at their prior level of function and does not require further acute OT services.     Plan:     During this hospitalization, patient does not require further acute OT services.  Please re-consult if situation changes.    Plan of Care Reviewed with: patient    Subjective     Chief Complaint: None  Patient/Family Comments/goals: Pt agreeable to OT evaluation.    Occupational Profile:  Living Environment: Lives with Significant Other  Previous level of function: Indep  Roles and Routines: MMA instructor  Equipment Used at home: none  Assistance upon Discharge: Significant other    Pain/Comfort:  Pain Rating 1: 2/10 (Right shin only)    Patients cultural, spiritual, Faith conflicts given the current situation: no    Objective:     Communicated with: nurse prior to session.  Patient found HOB elevated with peripheral IV (nurse in room, dressing and ACE wrap to right shin) upon OT entry to room.    General Precautions: Standard,    Orthopedic Precautions: N/A  Braces: N/A  Respiratory Status: Room air     Occupational Performance:    Bed Mobility:    Indep    Functional Mobility/Transfers:  Sit to stand: Indep  Transfers: Indep  Functional Mobility: Indep    Activities of Daily Living:  Indep    Cognitive/Visual Perceptual:  Intact, no deficits noted    Physical Exam:  UE Function: no deficits, Functionally 5/5  LE Function: no deficits, Functionally 5/5  Sitting Balance:  Normal  Standing Balance: Normal, one leg stance without LOB    AMPAC 6 Click ADL:  AMPAC Total Score: 24    Treatment & Education:  Role of OT    Patient left sitting edge of bed with all lines intact    GOALS:               Multidisciplinary Problems (Resolved)          Problem: Occupational Therapy    Goal Priority Disciplines Outcome Interventions   Occupational Therapy Goal   (Resolved)     OT, PT/OT Met    Description: Orders received and chart reviewed.  Evaluation complete with pt Indep with ADL and ADL mobility.  No further therapy needs.                       DME Justifications:  No DME recommended requiring DME justifications    History:     Past Medical History:   Diagnosis Date    Concussion 2019         Past Surgical History:   Procedure Laterality Date    LEG SURGERY Bilateral     R femur fx and L tib/fib fx repair    NOSE SURGERY      Fracture repair in 2019       Time Tracking:     OT Date of Treatment: 07/15/25  OT Start Time: 1709  OT Stop Time: 1714  OT Total Time (min): 5 min    Billable Minutes:Evaluation 5    7/15/2025

## 2025-07-15 NOTE — TRANSFER OF CARE
Anesthesia Transfer of Care Note    Patient: Mike Yadav    Procedure(s) Performed: Procedure(s) (LRB):  INCISION AND DRAINAGE, ABSCESS (Right)    Patient location: ICU    Anesthesia Type: MAC    Transport from OR: Transported from OR on room air with adequate spontaneous ventilation    Post pain: adequate analgesia    Post assessment: no apparent anesthetic complications and tolerated procedure well    Post vital signs: stable    Level of consciousness: awake and alert    Nausea/Vomiting: no nausea/vomiting    Complications: none    Transfer of care protocol was followedComments: Bedside report given. Opportunity to answer questions and address concerns provided. See ICU chart for VS upon arrival      Last vitals:   SEE ICU Chart for VS

## 2025-07-15 NOTE — ANESTHESIA POSTPROCEDURE EVALUATION
Anesthesia Post Evaluation    Patient: Mike Yadav    Procedure(s) Performed: Procedure(s) (LRB):  INCISION AND DRAINAGE, ABSCESS (Right)    Final Anesthesia Type: MAC      Patient location during evaluation: PACU  Patient participation: Yes- Able to Participate  Level of consciousness: awake and alert  Post-procedure vital signs: reviewed and stable  Pain management: adequate  Airway patency: patent    PONV status at discharge: No PONV  Anesthetic complications: no      Cardiovascular status: blood pressure returned to baseline  Respiratory status: unassisted and spontaneous ventilation  Hydration status: euvolemic  Follow-up not needed.              Vitals Value Taken Time   /81 07/15/25 12:47   Temp 36.8 °C (98.3 °F) 07/15/25 12:18   Pulse 58 07/15/25 13:04   Resp 22 07/15/25 13:04   SpO2 100 % 07/15/25 13:04   Vitals shown include unfiled device data.      No case tracking events are documented in the log.      Pain/Jerardo Score: No data recorded

## 2025-07-15 NOTE — INTERVAL H&P NOTE
The patient has been examined and the H&P has been reviewed:    I concur with the findings and no changes have occurred since H&P was written.    Surgery risks, benefits and alternative options discussed and understood by patient/family.          Active Hospital Problems    Diagnosis  POA    *Cellulitis and abscess of right leg [L03.115, L02.415]  Yes      Resolved Hospital Problems   No resolved problems to display.

## 2025-07-15 NOTE — PLAN OF CARE
Problem: Occupational Therapy  Goal: Occupational Therapy Goal  Description: Orders received and chart reviewed.  Evaluation complete with pt Indep with ADL and ADL mobility.  No further therapy needs.  Outcome: Met

## 2025-07-15 NOTE — OP NOTE
[facility]ensive [facility]  General Surgery  Operative Note    SUMMARY     Date of Procedure: 7/15/2025     Procedure: Procedure(s) (LRB):  INCISION AND DRAINAGE, ABSCESS (Right)     Debridement of right shin wound, 30 sq cm  Surgeons and Role:     * Rashad Robles MD - Primary    Assisting Surgeon: None    Pre-Operative Diagnosis: Leg swelling [M79.89]  Cellulitis of right lower extremity [L03.115]    Post-Operative Diagnosis: Post-Op Diagnosis Codes:     * Leg swelling [M79.89]     * Cellulitis of right lower extremity [L03.115]    Anesthesia: General/MAC    Operative Findings (including complications, if any):  7 x 5 x 4 cm abscess    Description of Technical Procedures:  After consent was obtained patient brought to the operating room placed in the supine position.    Entire right shin was prepped and draped.    Time-out performed.    Local anesthetic was injected with 1% lidocaine.    Fifteen blade scalpel was used to make a 1.5 cm incision overlying this abscess.    Copious purulence was expressed and this was sent for culture.    Patient also had necrotic tissue in his cavity which was debrided and removed.    Proximally 7 x 5 x 4 cm cavity was completely debrided and irrigated.    After it was cleaned out packing was placed with quarter-inch iodoform and a Penrose drain.    Dressing was placed at this time.    Patient tolerated procedure well.        Estimated Blood Loss (EBL): * No values recorded between 7/15/2025 11:24 AM and 7/15/2025 12:21 PM *           Implants: * No implants in log *    Specimens:   Specimen (24h ago, onward)      None                    Condition: Good    Disposition: PACU - hemodynamically stable.    Attestation: I was present and scrubbed for the entire procedure.

## 2025-07-15 NOTE — PLAN OF CARE
Case Management Assessment     PCP: None    Patient Arrived From: Home  Existing Help at Home: Self, Independent    Barriers to Discharge: None    Discharge Plan:    A. Home w/family   B. Home      Patient AAOx3, independent at baseline. Will need to establish care with PCP on discharge. Denies owning any DME. Spouse to provide transportation home.     07/15/25 1055   Discharge Assessment   Assessment Type Discharge Planning Assessment   Confirmed/corrected address, phone number and insurance Yes   Confirmed Demographics Correct on Facesheet   Source of Information patient   Communicated KADEN with patient/caregiver Date not available/Unable to determine   People in Home spouse   Name(s) of People in Home Drea Mcdaniel (Spouse)  [phone] (Mobile)   Do you expect to return to your current living situation? Yes   Do you have help at home or someone to help you manage your care at home? Yes   Who are your caregiver(s) and their phone number(s)? Drea Mcdaniel (Spouse)  [phone] (Mobile)   Prior to hospitilization cognitive status: Alert/Oriented   Current cognitive status: Alert/Oriented   Walking or Climbing Stairs Difficulty no   Dressing/Bathing Difficulty no   Equipment Currently Used at Home none   Readmission within 30 days? No   Do you currently have service(s) that help you manage your care at home? No   Do you take prescription medications? Yes   Do you have prescription coverage? Yes   Do you have any problems affording any of your prescribed medications? No   Is the patient taking medications as prescribed? yes   Who is going to help you get home at discharge? Drea Mcdaniel (Spouse)  [phone] (Mobile)   How do you get to doctors appointments? car, drives self   Are you on dialysis? No   Do you take coumadin? No   Discharge Plan A Home with family   Discharge Plan B Home   DME Needed Upon Discharge  none   Discharge Plan discussed with: Patient   Transition of Care Barriers None   Physical  Activity   On average, how many days per week do you engage in moderate to strenuous exercise (like a brisk walk)? 6 days  (Patient )   On average, how many minutes do you engage in exercise at this level? 150+ min   Financial Resource Strain   How hard is it for you to pay for the very basics like food, housing, medical care, and heating? Somewhat   Housing Stability   In the last 12 months, was there a time when you were not able to pay the mortgage or rent on time? N   At any time in the past 12 months, were you homeless or living in a shelter (including now)? N   Transportation Needs   In the past 12 months, has lack of transportation kept you from medical appointments or from getting medications? no   In the past 12 months, has lack of transportation kept you from meetings, work, or from getting things needed for daily living? No   Food Insecurity   Within the past 12 months, you worried that your food would run out before you got the money to buy more. Never true   Within the past 12 months, the food you bought just didn't last and you didn't have money to get more. Never true   Stress   Do you feel stress - tense, restless, nervous, or anxious, or unable to sleep at night because your mind is troubled all the time - these days? Not at all   Social Isolation   How often do you feel lonely or isolated from those around you?  Never   Alcohol Use   Q1: How often do you have a drink containing alcohol? 4 or more ti   Q2: How many drinks containing alcohol do you have on a typical day when you are drinking? 1 or 2   Utilities   In the past 12 months has the electric, gas, oil, or water company threatened to shut off services in your home? No   Health Literacy   How often do you need to have someone help you when you read instructions, pamphlets, or other written material from your doctor or pharmacy? Never     [facility] - Intensive Care ([facility])  Initial Discharge Assessment       Primary  Care Provider: No, Primary Doctor    Admission Diagnosis: Abscess [L02.91]  Leg swelling [M79.89]  Cellulitis of right lower extremity [L03.115]  Therapy failure due to antibiotic resistance [Z16.20]  Chest pain [R07.9]    Admission Date: 7/14/2025  Expected Discharge Date:     Transition of Care Barriers: (P) None    Payor: BLUE CROSS BLUE SHIELD / Plan: BCBS OF [facility] LOCAL PLUS / Product Type: Commercial /     Extended Emergency Contact Information  Primary Emergency Contact: Drea Mcdaniel  Mobile Phone: [phone]  Relation: Spouse  Secondary Emergency Contact: VicentaGautam  Mobile Phone: [phone]  Relation: Father    Discharge Plan A: (P) Home with family  Discharge Plan B: (P) Home      [facility] [address]  [address] [facility] [place] [address]  Phone: [phone] Fax: [phone]    [id number] [place], [place]  [address] AT [facility] [address]  [address]  [facility] [address]  Phone: [phone] Fax: [phone]      Initial Assessment (most recent)       Adult Discharge Assessment - 07/15/25 1055          Discharge Assessment    Assessment Type Discharge Planning Assessment     Confirmed/corrected address, phone number and insurance Yes     Confirmed Demographics Correct on Facesheet     Source of Information patient     Communicated KADEN with patient/caregiver Date not available/Unable to determine     People in Home spouse     Name(s) of People in Home Drea Mcdaniel (Spouse)  [phone] (Mobile) (P)      Do you expect to return to your current living situation? Yes (P)      Do you have help at home or someone to help you manage your care at home? Yes (P)      Who are your caregiver(s) and their phone number(s)? McdanielDrea jones (Spouse)  [phone] (Mobile) (P)      Prior to hospitilization cognitive status: Alert/Oriented (P)      Current cognitive status: Alert/Oriented (P)       Walking or Climbing Stairs Difficulty no (P)      Dressing/Bathing Difficulty no (P)      Equipment Currently Used at Home none (P)      Readmission within 30 days? No (P)      Do you currently have service(s) that help you manage your care at home? No (P)      Do you take prescription medications? Yes (P)      Do you have prescription coverage? Yes (P)      Do you have any problems affording any of your prescribed medications? No (P)      Is the patient taking medications as prescribed? yes (P)      Who is going to help you get home at discharge? Drea Mcdaniel (Spouse)  [phone] (Mobile) (P)      How do you get to doctors appointments? car, drives self (P)      Are you on dialysis? No (P)      Do you take coumadin? No (P)      Discharge Plan A Home with family (P)      Discharge Plan B Home (P)      DME Needed Upon Discharge  none (P)      Discharge Plan discussed with: Patient (P)      Transition of Care Barriers None (P)         Physical Activity    On average, how many days per week do you engage in moderate to strenuous exercise (like a brisk walk)? 6 days (P)    Patient     On average, how many minutes do you engage in exercise at this level? 150+ min (P)         Financial Resource Strain    How hard is it for you to pay for the very basics like food, housing, medical care, and heating? Somewhat hard (P)         Housing Stability    In the last 12 months, was there a time when you were not able to pay the mortgage or rent on time? No (P)      At any time in the past 12 months, were you homeless or living in a shelter (including now)? No (P)         Transportation Needs    In the past 12 months, has lack of transportation kept you from medical appointments or from getting medications? No (P)      In the past 12 months, has lack of transportation kept you from meetings, work, or from getting things needed for daily living? No (P)         Food Insecurity    Within the past 12  months, you worried that your food would run out before you got the money to buy more. Never true (P)      Within the past 12 months, the food you bought just didn't last and you didn't have money to get more. Never true (P)         Stress    Do you feel stress - tense, restless, nervous, or anxious, or unable to sleep at night because your mind is troubled all the time - these days? Not at all (P)         Social Isolation    How often do you feel lonely or isolated from those around you?  Never (P)         Alcohol Use    Q1: How often do you have a drink containing alcohol? 4 or more times a week (P)      Q2: How many drinks containing alcohol do you have on a typical day when you are drinking? 1 or 2 (P)         Utilities    In the past 12 months has the electric, gas, oil, or water company threatened to shut off services in your home? No (P)         Health Literacy    How often do you need to have someone help you when you read instructions, pamphlets, or other written material from your doctor or pharmacy? Never (P)

## 2025-07-16 ENCOUNTER — HOSPITAL ENCOUNTER (INPATIENT)
Facility: OTHER | Age: 40
LOS: 2 days | Discharge: HOME | DRG: 603 | End: 2025-07-18
Attending: INTERNAL MEDICINE | Admitting: INTERNAL MEDICINE
Payer: COMMERCIAL

## 2025-07-16 VITALS
WEIGHT: 175 LBS | DIASTOLIC BLOOD PRESSURE: 86 MMHG | TEMPERATURE: 98 F | RESPIRATION RATE: 31 BRPM | SYSTOLIC BLOOD PRESSURE: 140 MMHG | OXYGEN SATURATION: 97 % | BODY MASS INDEX: 25.92 KG/M2 | HEART RATE: 73 BPM | HEIGHT: 69 IN

## 2025-07-16 DIAGNOSIS — R60.9 SWELLING: ICD-10-CM

## 2025-07-16 DIAGNOSIS — L03.115 CELLULITIS AND ABSCESS OF RIGHT LEG: ICD-10-CM

## 2025-07-16 DIAGNOSIS — L02.415 CELLULITIS AND ABSCESS OF RIGHT LEG: ICD-10-CM

## 2025-07-16 PROBLEM — Z87.898 HISTORY OF SUBSTANCE USE: Status: ACTIVE | Noted: 2025-07-16

## 2025-07-16 PROBLEM — Z78.9 DAILY CONSUMPTION OF ALCOHOL: Status: ACTIVE | Noted: 2025-07-16

## 2025-07-16 PROBLEM — F12.90 MARIJUANA USE: Status: ACTIVE | Noted: 2025-07-16

## 2025-07-16 LAB
ABSOLUTE EOSINOPHIL (OHS): 0.29 K/UL
ABSOLUTE MONOCYTE (OHS): 0.95 K/UL (ref 0.3–1)
ABSOLUTE NEUTROPHIL COUNT (OHS): 8 K/UL (ref 1.8–7.7)
ALBUMIN SERPL BCP-MCNC: 3.4 G/DL (ref 3.5–5.2)
ALP SERPL-CCNC: 79 UNIT/L (ref 40–150)
ALT SERPL W/O P-5'-P-CCNC: 10 UNIT/L (ref 10–44)
ANION GAP (OHS): 13 MMOL/L (ref 8–16)
AST SERPL-CCNC: 15 UNIT/L (ref 11–45)
BASOPHILS # BLD AUTO: 0.04 K/UL
BASOPHILS NFR BLD AUTO: 0.4 %
BILIRUB SERPL-MCNC: <0.5 MG/DL (ref 0.1–1)
BILIRUB UR QL STRIP.AUTO: NEGATIVE
BUN SERPL-MCNC: 9 MG/DL (ref 6–20)
CALCIUM SERPL-MCNC: 8.8 MG/DL (ref 8.7–10.5)
CHLORIDE SERPL-SCNC: 103 MMOL/L (ref 95–110)
CLARITY UR: CLEAR
CO2 SERPL-SCNC: 22 MMOL/L (ref 23–29)
COLOR UR AUTO: YELLOW
CREAT SERPL-MCNC: 1 MG/DL (ref 0.5–1.4)
CRP SERPL-MCNC: 113.8 MG/L
ERYTHROCYTE [DISTWIDTH] IN BLOOD BY AUTOMATED COUNT: 12.3 % (ref 11.5–14.5)
GFR SERPLBLD CREATININE-BSD FMLA CKD-EPI: >60 ML/MIN/1.73/M2
GLUCOSE SERPL-MCNC: 83 MG/DL (ref 70–110)
GLUCOSE UR QL STRIP: NEGATIVE
HCT VFR BLD AUTO: 34.7 % (ref 40–54)
HGB BLD-MCNC: 11.2 GM/DL (ref 14–18)
HGB UR QL STRIP: NEGATIVE
HOLD SPECIMEN: 1
HOLD SPECIMEN: 1
IMM GRANULOCYTES # BLD AUTO: 0.04 K/UL (ref 0–0.04)
IMM GRANULOCYTES NFR BLD AUTO: 0.4 % (ref 0–0.5)
KETONES UR QL STRIP: NEGATIVE
LACTATE SERPL-SCNC: 1.4 MMOL/L (ref 0.5–2.2)
LDH SERPL L TO P-CCNC: 1.19 MMOL/L (ref 0.5–2.2)
LEUKOCYTE ESTERASE UR QL STRIP: NEGATIVE
LYMPHOCYTES # BLD AUTO: 1.16 K/UL (ref 1–4.8)
MCH RBC QN AUTO: 30.6 PG (ref 27–31)
MCHC RBC AUTO-ENTMCNC: 32.3 G/DL (ref 32–36)
MCV RBC AUTO: 95 FL (ref 82–98)
NITRITE UR QL STRIP: NEGATIVE
NUCLEATED RBC (/100WBC) (OHS): 0 /100 WBC
PH UR STRIP: 7 [PH]
PLATELET # BLD AUTO: 323 K/UL (ref 150–450)
PMV BLD AUTO: 9.1 FL (ref 9.2–12.9)
POTASSIUM SERPL-SCNC: 4.1 MMOL/L (ref 3.5–5.1)
PROT SERPL-MCNC: 7.2 GM/DL (ref 6–8.4)
PROT UR QL STRIP: NEGATIVE
RBC # BLD AUTO: 3.66 M/UL (ref 4.6–6.2)
RELATIVE EOSINOPHIL (OHS): 2.8 %
RELATIVE LYMPHOCYTE (OHS): 11.1 % (ref 18–48)
RELATIVE MONOCYTE (OHS): 9.1 % (ref 4–15)
RELATIVE NEUTROPHIL (OHS): 76.2 % (ref 38–73)
SAMPLE: NORMAL
SODIUM SERPL-SCNC: 138 MMOL/L (ref 136–145)
SP GR UR STRIP: 1.02
UROBILINOGEN UR STRIP-ACNC: NEGATIVE EU/DL
WBC # BLD AUTO: 10.48 K/UL (ref 3.9–12.7)

## 2025-07-16 PROCEDURE — 36415 COLL VENOUS BLD VENIPUNCTURE: CPT | Performed by: PHYSICIAN ASSISTANT

## 2025-07-16 PROCEDURE — 96374 THER/PROPH/DIAG INJ IV PUSH: CPT

## 2025-07-16 PROCEDURE — 63600175 PHARM REV CODE 636 W HCPCS: Performed by: NURSE PRACTITIONER

## 2025-07-16 PROCEDURE — 63600175 PHARM REV CODE 636 W HCPCS: Performed by: PHYSICIAN ASSISTANT

## 2025-07-16 PROCEDURE — 96375 TX/PRO/DX INJ NEW DRUG ADDON: CPT

## 2025-07-16 PROCEDURE — 80053 COMPREHEN METABOLIC PANEL: CPT | Performed by: NURSE PRACTITIONER

## 2025-07-16 PROCEDURE — 25000003 PHARM REV CODE 250: Performed by: PHYSICIAN ASSISTANT

## 2025-07-16 PROCEDURE — 81003 URINALYSIS AUTO W/O SCOPE: CPT | Performed by: PHYSICIAN ASSISTANT

## 2025-07-16 PROCEDURE — 63600175 PHARM REV CODE 636 W HCPCS: Mod: JZ,TB | Performed by: PHYSICIAN ASSISTANT

## 2025-07-16 PROCEDURE — 36415 COLL VENOUS BLD VENIPUNCTURE: CPT | Performed by: INTERNAL MEDICINE

## 2025-07-16 PROCEDURE — 87040 BLOOD CULTURE FOR BACTERIA: CPT | Performed by: PHYSICIAN ASSISTANT

## 2025-07-16 PROCEDURE — 99285 EMERGENCY DEPT VISIT HI MDM: CPT | Mod: 25

## 2025-07-16 PROCEDURE — 85025 COMPLETE CBC W/AUTO DIFF WBC: CPT | Performed by: NURSE PRACTITIONER

## 2025-07-16 PROCEDURE — 21400001 HC TELEMETRY ROOM

## 2025-07-16 PROCEDURE — 83605 ASSAY OF LACTIC ACID: CPT | Performed by: INTERNAL MEDICINE

## 2025-07-16 PROCEDURE — 86140 C-REACTIVE PROTEIN: CPT | Performed by: NURSE PRACTITIONER

## 2025-07-16 RX ORDER — HYDROCODONE BITARTRATE AND ACETAMINOPHEN 10; 325 MG/1; MG/1
1 TABLET ORAL EVERY 4 HOURS PRN
Refills: 0 | Status: DISCONTINUED | OUTPATIENT
Start: 2025-07-16 | End: 2025-07-18 | Stop reason: HOSPADM

## 2025-07-16 RX ORDER — HYDROCODONE BITARTRATE AND ACETAMINOPHEN 5; 325 MG/1; MG/1
1 TABLET ORAL EVERY 4 HOURS PRN
Refills: 0 | Status: DISCONTINUED | OUTPATIENT
Start: 2025-07-16 | End: 2025-07-18 | Stop reason: HOSPADM

## 2025-07-16 RX ORDER — ACETAMINOPHEN 325 MG/1
650 TABLET ORAL EVERY 4 HOURS PRN
Status: DISCONTINUED | OUTPATIENT
Start: 2025-07-16 | End: 2025-07-18 | Stop reason: HOSPADM

## 2025-07-16 RX ORDER — TALC
6 POWDER (GRAM) TOPICAL NIGHTLY PRN
Status: DISCONTINUED | OUTPATIENT
Start: 2025-07-16 | End: 2025-07-18 | Stop reason: HOSPADM

## 2025-07-16 RX ORDER — ONDANSETRON 8 MG/1
8 TABLET, ORALLY DISINTEGRATING ORAL EVERY 6 HOURS PRN
Status: DISCONTINUED | OUTPATIENT
Start: 2025-07-16 | End: 2025-07-18 | Stop reason: HOSPADM

## 2025-07-16 RX ORDER — SODIUM CHLORIDE 0.9 % (FLUSH) 0.9 %
10 SYRINGE (ML) INJECTION
Status: DISCONTINUED | OUTPATIENT
Start: 2025-07-16 | End: 2025-07-18 | Stop reason: HOSPADM

## 2025-07-16 RX ORDER — ONDANSETRON HYDROCHLORIDE 2 MG/ML
8 INJECTION, SOLUTION INTRAVENOUS EVERY 6 HOURS PRN
Status: DISCONTINUED | OUTPATIENT
Start: 2025-07-16 | End: 2025-07-18 | Stop reason: HOSPADM

## 2025-07-16 RX ORDER — KETOROLAC TROMETHAMINE 30 MG/ML
10 INJECTION, SOLUTION INTRAMUSCULAR; INTRAVENOUS
Status: COMPLETED | OUTPATIENT
Start: 2025-07-16 | End: 2025-07-16

## 2025-07-16 RX ORDER — ENOXAPARIN SODIUM 100 MG/ML
40 INJECTION SUBCUTANEOUS EVERY 24 HOURS
Status: DISCONTINUED | OUTPATIENT
Start: 2025-07-16 | End: 2025-07-18 | Stop reason: HOSPADM

## 2025-07-16 RX ORDER — POLYETHYLENE GLYCOL 3350 17 G/17G
17 POWDER, FOR SOLUTION ORAL 2 TIMES DAILY PRN
Status: DISCONTINUED | OUTPATIENT
Start: 2025-07-16 | End: 2025-07-18 | Stop reason: HOSPADM

## 2025-07-16 RX ORDER — AMOXICILLIN 250 MG
1 CAPSULE ORAL 2 TIMES DAILY
Status: DISCONTINUED | OUTPATIENT
Start: 2025-07-16 | End: 2025-07-18 | Stop reason: HOSPADM

## 2025-07-16 RX ORDER — CEFTRIAXONE 1 G/1
1 INJECTION, POWDER, FOR SOLUTION INTRAMUSCULAR; INTRAVENOUS
Status: DISCONTINUED | OUTPATIENT
Start: 2025-07-16 | End: 2025-07-18 | Stop reason: HOSPADM

## 2025-07-16 RX ADMIN — KETOROLAC TROMETHAMINE 10 MG: 30 INJECTION, SOLUTION INTRAMUSCULAR at 06:07

## 2025-07-16 RX ADMIN — ENOXAPARIN SODIUM 40 MG: 40 INJECTION SUBCUTANEOUS at 07:07

## 2025-07-16 RX ADMIN — VANCOMYCIN HYDROCHLORIDE 1250 MG: 1.25 INJECTION, POWDER, LYOPHILIZED, FOR SOLUTION INTRAVENOUS at 09:07

## 2025-07-16 RX ADMIN — CEFTRIAXONE 1 G: 1 INJECTION, POWDER, FOR SOLUTION INTRAMUSCULAR; INTRAVENOUS at 06:07

## 2025-07-16 NOTE — DISCHARGE SUMMARY
"[facility] Intensive Care [facility] Medicine  Discharge Summary      Patient Name: Mike Yadav  MRN: [id number]  RANI: [id number]  Patient Class: OP- Observation  Admission Date: 7/14/2025  Hospital Length of Stay: 0 days  Discharge Date and Time: 7/15/2025  8:00 PM  Attending Physician: Aliza att. providers found   Discharging Provider: NGUYỄN Pradhan MD  Primary Care Provider: Aliza, Primary Doctor    Primary Care Team: Networked reference to record PCT     HPI:   40 y/o male with no significant PMHx who presented with c/o right leg pain, swelling and redness increasing for 2-3 days and start of fever/chill last night. He reported getting a cut to his right leg 2 months ago while kicking a stop sign. He cleaned and treated with wound at home and it seemed to have self resolved. 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 Then about 2 weeks ago, he noticed increasing redness and pain of his right leg around the same area. This time, he went to an Urgent Care where he was given antibiotics with improvement - he stated he got an IM injection of antibiotics x 1 and was discharged on a course of doxycycline and topical Bactroban ointment with resolution of redness. He stopped training while the wound healed but resumed training again after he finished his antibiotics and after it appeared to have resolved. He again presents with redness and tenderness to palpation, no reported drainage to that site, but markedly worse that previous times, and increased pain with movement and touch. In the ER, workup with CT of right leg showed "no evidence of osteomyelitis. Fluid collection with rim enhancement and small gas bubbles anterior to the distal tibia concerning for an abscess collection. Generalized edema and fluid tracking within the soft tissues of the right leg."    Procedure(s) (LRB):  INCISION AND DRAINAGE, ABSCESS (Right)  " "    Hospital Course:   Admitted with RLE cellulitis and started ceftriaxone, vancomycin. Surgery consulted and underwent I&D 07/15. Preliminary blood cultures negative and intra-operative cultures collected. Patient reported he was unable to remain in hospital for the following day; discussed treatment options and ultimately plan for 14 day course of cefdinir and TMP-SMX. Referred for general surgery and internal medicine follow-up after discharge; instructed patient we will call with any culture results that demonstrate inability to properly treat with PO antibiotics. With clinical stability, he was prepared for discharge and instructions on re-presentation to ED if symptoms were to worsen or if contacted with abnormal culture results.     Goals of Care Treatment Preferences:  Code Status: Full Code         Consults:   Consults (From admission, onward)          Status Ordering Provider     Pharmacy to dose Vancomycin consult  Once        Provider:  (Not yet assigned)   Placed in "And" Linked Group    Acknowledged RICHA TAM            Assessment & Plan  Cellulitis and abscess of right leg  - Presented with exam findings consistent with abscess and cellulitis and workup with CT of right leg showed "no evidence of osteomyelitis. Fluid collection with rim enhancement and small gas bubbles anterior to the distal tibia concerning for an abscess collection. Generalized edema and fluid tracking within the soft tissues of the right leg."  - Given cefepime, clindamycin and vancomycin in the ER. Presentation not consistent with sepsis. Not a treatment failure of PO antibiotics given resolution with treatment, but only returned after patient engaged in training again   - Will obtain blood cultures now  - Case discussed with General surgery, Dr. Robles, and he will take patient to the OR tomorrow for abscess drainage. Will make NPO after MN.   - Will continue ceftriaxone and vancomycin for continued antibiotic " treatment.   - Pain control and supportive care  - Trend with labs daily and check PT/INR  Final Active Diagnoses:    Diagnosis Date Noted POA    PRINCIPAL PROBLEM:  Cellulitis and abscess of right leg [L03.115, L02.415] 07/14/2025 Yes      Problems Resolved During this Admission:       Discharged Condition: good    Disposition: Home or Self Care    Follow Up:   Follow-up Information       [facility] Follow up in 1 week(s).    Specialty: Internal Medicine  Why: post-hospital follow-up and to establish PCP  Contact information:  [address] [facility] [address] [phone]  Additional information:  Turn at Entrance 1 on [facility] in [facility] and take elevators to Floor 2. Follow signs to [facility]. Take [facility] to Floor 8 for [address].             Rashad Obando MD Follow up in 1 week(s).    Specialties: General Surgery, Bariatrics  Why: follow-up after surgery  Contact information:  [address]  [address]  [facility] [address] [phone]                           Patient Instructions:      Ambulatory referral/consult to Internal Medicine   Standing Status: Future   Referral Priority: Urgent Referral Type: Consultation   Referral Reason: Specialty Services Required   Requested Specialty: Internal Medicine   Number of Visits Requested: 1     Ambulatory referral/consult to General Surgery   Standing Status: Future   Referral Priority: Urgent Referral Type: Consultation   Referral Reason: Specialty Services Required   Referred to Provider: RASHAD OBANDO Requested Specialty: General Surgery   Number of Visits Requested: 1     Diet Adult Regular     Notify your health care provider if you experience any of the following:  redness, tenderness, or signs of infection (pain, swelling, redness, odor or green/yellow discharge around incision site)     Notify your health care provider if you experience any of the following:   severe uncontrolled pain     Notify your health care provider if you experience any of the following:  temperature >100.4     Notify your health care provider if you experience any of the following:  persistent dizziness, light-headedness, or visual disturbances     Notify your health care provider if you experience any of the following:  increased confusion or weakness     Activity as tolerated       Significant Diagnostic Studies:   CBC:  Recent Labs   Lab 07/14/25  1226 07/15/25  0352   WBC 13.31* 13.08*   HGB 13.0* 12.5*   HCT 39.7* 37.5*    296   NEUTROAUTO 81.7* 79.0*   LYMPH 1.17  8.8* 1.21  9.3*   MONO 7.4  0.98 8.4  1.10*   EOS 1.1  0.15 2.4  0.32     BMP:  Recent Labs   Lab 07/14/25  1226 07/15/25  0352    138   K 3.8 3.8    104   CO2 25 21*   BUN 11 10   CREATININE 0.8 0.7   GLU 89 101   CALCIUM 9.3 8.8   MG  --  1.8   PHOS  --  3.7     CMP:  Recent Labs   Lab 07/14/25  1226 07/15/25  0352    138   K 3.8 3.8    104   CO2 25 21*   BUN 11 10   CREATININE 0.8 0.7   GLU 89 101   CALCIUM 9.3 8.8   MG  --  1.8   PHOS  --  3.7   ALKPHOS 94 88   AST 21 14   ALT 15 10   BILITOT 0.7 0.8   PROT 8.2 7.1   ALBUMIN 4.0 3.3*   ANIONGAP 11 13     Imaging Results              CT Leg (Tibia-Fibula) Wtih Contrast Right (Final result)  Result time 07/14/25 14:39:14      Final result by Marlon Soto III, MD (07/14/25 14:39:14)                   Impression:      No evidence of osteomyelitis.    Fluid collection with rim enhancement and small gas bubbles anterior to the distal tibia concerning for an abscess collection.    Generalized edema and fluid tracking within the soft tissues of the right leg.      Electronically signed by: Marlon Soto MD  Date:    07/14/2025  Time:    14:39               Narrative:    EXAMINATION:  CT LEG (TIBIA-FIBULA) WITH CONTRAST RIGHT    CLINICAL HISTORY:  Osteomyelitis suspected, tib/fib, xray done;    FINDINGS:  Leg tib fib.    Patient was administered  75 cc of Omnipaque 350 intravenously.    There is subcutaneous edema, and fluid seen tracking within the soft tissues of the distal leg.  Anteriorly there is a rim enhancing collection containing gas bubbles anterior to the tibia.  This measures 7.8 cm craniocaudal, 3.4 cm transverse, and 2.1 cm AP.  This is consistent with a subcutaneous abscess.  There is also edema and cellulitis.  Posteriorly there is are some none rim enhancing collections thought to be edema in fluid tracking within the soft tissues.  No bone destruction is seen to suggest osteomyelitis.                                       X-Ray Tibia Fibula 2 View Right (Final result)  Result time 07/14/25 12:06:11      Final result by Marlon Soto III, MD (07/14/25 12:06:11)                   Impression:      No acute process seen.      Electronically signed by: Marlon Soto MD  Date:    07/14/2025  Time:    12:06               Narrative:    EXAMINATION:  XR TIBIA FIBULA 2 VIEW RIGHT    CLINICAL HISTORY:  Other specified soft tissue disorders    FINDINGS:  Tib fib two views right.    No fracture, dislocation, or bone destruction seen.  No acute trauma seen.  No foreign body seen.  No acute trauma seen.                                      Pending Diagnostic Studies:       None           Medications:  Reconciled Home Medications:      Medication List        START taking these medications      cefdinir 300 MG capsule  Commonly known as: OMNICEF  Take 1 capsule (300 mg total) by mouth 2 (two) times daily. for 14 days     ibuprofen 200 MG tablet  Commonly known as: ADVIL,MOTRIN  Take 3-4 tablets (600-800 mg total) by mouth 3 (three) times daily as needed for Pain.     sulfamethoxazole-trimethoprim 800-160mg 800-160 mg Tab  Commonly known as: BACTRIM DS  Take 1 tablet by mouth 2 (two) times daily. for 14 days            CONTINUE taking these medications      EScitalopram oxalate 10 MG tablet  Commonly known as: LEXAPRO  Take 10 mg by mouth once daily.               Indwelling Lines/Drains at time of discharge:   Lines/Drains/Airways       Drain  Duration                  Open Drain 07/15/25 1201 Tube - 1 Inferior;Proximal;Right Leg Penrose 1/4 inch <1 day                        Time spent on the discharge of patient: 35 minutes         NGUYỄN Pradhan MD  Department of Hospital Medicine  [facility] - Intensive Care ([place])

## 2025-07-16 NOTE — FIRST PROVIDER EVALUATION
Medical screening examination initiated.  I have conducted a focused provider triage encounter, findings are as follows:    Brief history of present illness:  Pt is a 38 yo male presenting for right foot swelling and pain.  Pt was recently admitted for cellulitis.  Pt was discharged and transitioned to oral medications.  Pt states redness and swelling of foot has worsened.      Vitals:    07/16/25 1606   BP: (!) 143/72   Pulse: 77   Resp: 20   Temp: 99.7 °F (37.6 °C)   TempSrc: Oral   SpO2: 98%   Weight: 79.4 kg (175 lb)       Pertinent physical exam:  swelling and redness to RLE.      Brief workup plan:  labs, imaging.     Preliminary workup initiated; this workup will be continued and followed by the physician or advanced practice provider that is assigned to the patient when roomed.

## 2025-07-16 NOTE — HOSPITAL COURSE
Admitted with RLE cellulitis and started ceftriaxone, vancomycin. Surgery consulted and underwent I&D 07/15. Preliminary blood cultures negative and intra-operative cultures collected. Patient reported he was unable to remain in hospital for the following day; discussed treatment options and ultimately plan for 14 day course of cefdinir and TMP-SMX. Referred for general surgery and internal medicine follow-up after discharge; instructed patient we will call with any culture results that demonstrate inability to properly treat with PO antibiotics. With clinical stability, he was prepared for discharge and instructions on re-presentation to ED if symptoms were to worsen or if contacted with abnormal culture results.

## 2025-07-16 NOTE — NURSING
Pts IV removed prior to discharged, gauze and tape dressing to site. Pt verbalized understanding of all discharge paperwork and is aware of follow-up appointments. Pt states that he has all personal belongings in his possession. Pt requested to ambulate off of the unit and declined wheel chair. Pt assisted into the car upon arrival to vehicle, pts wife operating the vehicle. Pt d/c'd at this time with belongings and discharge paperwork as per MD order.

## 2025-07-16 NOTE — ED PROVIDER NOTES
Source of History:  Patient and medical chart    Chief complaint:  Wound Check (D/c yesterday & today has increased edema and reddness to rle, right ankle & right foot)      HPI:  Mike Yadav is a 39 y.o. male presenting with recurrence and right lower extremity pain and swelling.  Patient is known to me as he was recently seen in this ER where he had worsening infection despite oral antibiotics.  He was found to have abscess in the right anterior shin.  Who is admitted for IV antibiotics and surgical washout.  Underwent surgical washout with no immediate complications.  Ultimately felt well to leave and wanted to do a trial of oral antibiotics rather than continued IV.  States that he noted today he had increased pain and swelling with spreading redness to the right lower extremity.  Does report dressing is saturated.  Denies any changing her dressings.  States subjective fever and chills.  Has not tried any additional medications outside of antibiotic.  He is currently on Bactrim.    This is the extent to the patients complaints today here in the emergency department.    ROS: As per HPI     Review of patient's allergies indicates:   Allergen Reactions    Amoxicillin Hives       PMH:  As per HPI and below:  Past Medical History:   Diagnosis Date    Concussion 2019     Past Surgical History:   Procedure Laterality Date    INCISION AND DRAINAGE OF ABSCESS Right 7/15/2025    Procedure: INCISION AND DRAINAGE, ABSCESS;  Surgeon: Rashad Robles MD;  Location: [facility];  Service: General;  Laterality: Right;    LEG SURGERY Bilateral     R femur fx and L tib/fib fx repair    NOSE SURGERY      Fracture repair in 2019       Social History[1]    Physical Exam:    BP (!) 143/72   Pulse 77   Temp 99.7 °F (37.6 °C) (Oral)   Resp 20   Wt 79.4 kg (175 lb)   SpO2 98%   BMI 25.84 kg/m²   Nursing note and vital signs reviewed.  Constitutional: No acute distress.  Nontoxic  Eyes: No conjunctival  injection.Extraocular muscles are intact.  ENT: Oropharynx clear.  Normal phonation.   Cardiovascular: Regular rate and rhythm.  No murmurs. No gallops. No rubs  Respiratory: Clear to auscultation bilaterally.  Good air movement.  No wheezes.  No rhonchi. No rales. No accessory muscle use..  Abdomen: Soft.  Not distended.  Nontender.  No guarding.  No rebound. Non-peritoneal.  Musculoskeletal: Good range of motion all joints no pain on range of motion.  No obvious bony deformity.  Neck supple.  No meningismus.  Skin:  See picture  Neurologic: Motor intact.  Sensation intact.  No ataxia. No focal neurological deficits.  Psych: Appropriate, conversant    Labs that have been ordered have been independently reviewed and interpreted by myself.    I decided to obtain the patient's medical records.      MDM/ Differential Dx:    Mike Yadav 39 y.o. presented to the ED with c/o skin eruption.  Physical exam reveals ill however nontoxic appearing male with worsening right lower extremity cellulitis.  Had concurrent abscess with recent I and D and operating room.  See picture for further evaluation    Differential Diagnosis includes, but is not limited to:  Sepsis, bacteremia, cellulitis, abscess, fracture, dislocation      ED management:  Given slight tachycardia and temp of 99° will initiate sepsis workup with known infection.  Will restart antibiotics that were given last hospital stay as did not suspect failed as only received essentially 1 day dose.  Preliminary cultures from site or showing staph.  Vancomycin and Rocephin ordered.  Labs in the ER reassuring including lactic.  Discuss with Hospital Medicine with plan for admission.  Discuss with general surgery with no further surgical recommendations at this time outside of packing removal tomorrow.    Impression/Plan: Patient informed of diagnosis Diagnoses of Swelling and Cellulitis and abscess of right leg were pertinent to this visit.     Results for orders  placed or performed during the hospital encounter of 07/16/25   Comprehensive metabolic panel    Collection Time: 07/16/25  4:32 PM   Result Value Ref Range    Sodium 138 136 - 145 mmol/L    Potassium 4.1 3.5 - 5.1 mmol/L    Chloride 103 95 - 110 mmol/L    CO2 22 (L) 23 - 29 mmol/L    Glucose 83 70 - 110 mg/dL    BUN 9 6 - 20 mg/dL    Creatinine 1.0 0.5 - 1.4 mg/dL    Calcium 8.8 8.7 - 10.5 mg/dL    Protein Total 7.2 6.0 - 8.4 gm/dL    Albumin 3.4 (L) 3.5 - 5.2 g/dL    Bilirubin Total <0.5 0.1 - 1.0 mg/dL    ALP 79 40 - 150 unit/L    AST 15 11 - 45 unit/L    ALT 10 10 - 44 unit/L    Anion Gap 13 8 - 16 mmol/L    eGFR >60 >60 mL/min/1.73/m2   C-reactive protein    Collection Time: 07/16/25  4:32 PM   Result Value Ref Range    .8 (H) <=8.2 mg/L   CBC with Differential    Collection Time: 07/16/25  4:32 PM   Result Value Ref Range    WBC 10.48 3.90 - 12.70 K/uL    RBC 3.66 (L) 4.60 - 6.20 M/uL    HGB 11.2 (L) 14.0 - 18.0 gm/dL    HCT 34.7 (L) 40.0 - 54.0 %    MCV 95 82 - 98 fL    MCH 30.6 27.0 - 31.0 pg    MCHC 32.3 32.0 - 36.0 g/dL    RDW 12.3 11.5 - 14.5 %    Platelet Count 323 150 - 450 K/uL    MPV 9.1 (L) 9.2 - 12.9 fL    Nucleated RBC 0 <=0 /100 WBC    Neut % 76.2 (H) 38 - 73 %    Lymph % 11.1 (L) 18 - 48 %    Mono % 9.1 4 - 15 %    Eos % 2.8 <=8 %    Basophil % 0.4 <=1.9 %    Imm Grans % 0.4 0.0 - 0.5 %    Neut # 8.00 (H) 1.8 - 7.7 K/uL    Lymph # 1.16 1 - 4.8 K/uL    Mono # 0.95 0.3 - 1 K/uL    Eos # 0.29 <=0.5 K/uL    Baso # 0.04 <=0.2 K/uL    Imm Grans # 0.04 0.00 - 0.04 K/uL   Urinalysis, Reflex to Urine Culture Urine, Clean Catch    Collection Time: 07/16/25  5:49 PM    Specimen: Urine   Result Value Ref Range    Color, UA Yellow Straw, Clare, Yellow, Light-Orange    Appearance, UA Clear Clear    pH, UA 7.0 5.0 - 8.0    Spec Grav UA 1.020 1.005 - 1.030    Protein, UA Negative Negative    Glucose, UA Negative Negative    Ketones, UA Negative Negative    Bilirubin, UA Negative Negative    Blood, UA  Negative Negative    Nitrites, UA Negative Negative    Urobilinogen, UA Negative <2.0 EU/dL    Leukocyte Esterase, UA Negative Negative   ISTAT Lactate    Collection Time: 25  5:53 PM   Result Value Ref Range    POC Lactate 1.19 0.5 - 2.2 mmol/L    Sample VENOUS      Imaging Results              X-Ray Foot Complete Right (Final result)  Result time 25 16:50:07      Final result by Olvin Perry MD (25 16:50:07)                   Impression:      No acute fracture or dislocation of the foot.    Radiopaque density within the soft tissues of the lower leg seen on the oblique view.  Please correlate with any recent procedure or packing material within the soft tissues.      Electronically signed by: Olvin Perry MD  Date:    2025  Time:    16:50               Narrative:    EXAMINATION:  XR FOOT COMPLETE 3 VIEW RIGHT    CLINICAL HISTORY:  . Edema, unspecified    TECHNIQUE:  AP, lateral, and oblique views of the right foot were performed.    COMPARISON:  None    FINDINGS:  No evidence of an acute fracture or dislocation.  No osseous erosion.  There is a radiopaque density within the soft tissues of the lower leg seen only on the oblique view.                                    Launch MDCalc MDM  MDCalc MDM Module  2025 7:29 PM [Carie Zamudio]  Data:  - Test/documents/historian: 3+ tests ordered  Problems: Concern for Bacteremia  Additional encounter diagnoses: Swelling, Cellulitis and abscess of right leg  Risk: vancomycin - pharmacy to dose + 1 more (Require intensive monitoring), Admitted (Decision regarding hospitalization)           Diagnostic Impression:    1. Swelling    2. Cellulitis and abscess of right leg         ED Disposition Condition    Admit                       [1]   Social History  Tobacco Use    Smoking status: Former     Current packs/day: 0.00     Types: Cigarettes     Quit date: 2019     Years since quittin.1    Smokeless tobacco: Never   Substance  Use Topics    Alcohol use: Yes     Comment: 3-5 drinks per day    Drug use: Yes     Types: Marijuana        Carie Zamudio PA  07/16/25 1920

## 2025-07-16 NOTE — EICU
Intervention Initiated From:  [facility] / [facility]    Renetta intervened regarding:  Rounding (Video assessment)    VICU Night Rounds Checklist  24H Vital Sign Range:  Temp:  [98.2 °F (36.8 °C)-98.7 °F (37.1 °C)]   Pulse:  [56-77]   Resp:  [13-31]   BP: (104-144)/(53-86)   SpO2:  [95 %-100 %]     Video rounds and LDA reconciliation

## 2025-07-17 PROBLEM — S06.0X0A CONCUSSION WITH NO LOSS OF CONSCIOUSNESS: Status: RESOLVED | Noted: 2023-08-14 | Resolved: 2025-07-17

## 2025-07-17 LAB
ABSOLUTE EOSINOPHIL (OHS): 0.41 K/UL
ABSOLUTE MONOCYTE (OHS): 0.98 K/UL (ref 0.3–1)
ABSOLUTE NEUTROPHIL COUNT (OHS): 4.41 K/UL (ref 1.8–7.7)
ALBUMIN SERPL BCP-MCNC: 2.9 G/DL (ref 3.5–5.2)
ALP SERPL-CCNC: 77 UNIT/L (ref 40–150)
ALT SERPL W/O P-5'-P-CCNC: 8 UNIT/L (ref 10–44)
ANION GAP (OHS): 9 MMOL/L (ref 8–16)
AST SERPL-CCNC: 14 UNIT/L (ref 11–45)
BACTERIA SPEC ANAEROBE CULT: NORMAL
BASOPHILS # BLD AUTO: 0.06 K/UL
BASOPHILS NFR BLD AUTO: 0.8 %
BILIRUB SERPL-MCNC: 0.2 MG/DL (ref 0.1–1)
BUN SERPL-MCNC: 7 MG/DL (ref 6–20)
CALCIUM SERPL-MCNC: 8.6 MG/DL (ref 8.7–10.5)
CHLORIDE SERPL-SCNC: 107 MMOL/L (ref 95–110)
CO2 SERPL-SCNC: 22 MMOL/L (ref 23–29)
CREAT SERPL-MCNC: 0.7 MG/DL (ref 0.5–1.4)
ERYTHROCYTE [DISTWIDTH] IN BLOOD BY AUTOMATED COUNT: 12.6 % (ref 11.5–14.5)
GFR SERPLBLD CREATININE-BSD FMLA CKD-EPI: >60 ML/MIN/1.73/M2
GLUCOSE SERPL-MCNC: 108 MG/DL (ref 70–110)
HCT VFR BLD AUTO: 35.8 % (ref 40–54)
HGB BLD-MCNC: 12.1 GM/DL (ref 14–18)
HOLD SPECIMEN: NORMAL
IMM GRANULOCYTES # BLD AUTO: 0.03 K/UL (ref 0–0.04)
IMM GRANULOCYTES NFR BLD AUTO: 0.4 % (ref 0–0.5)
LYMPHOCYTES # BLD AUTO: 1.37 K/UL (ref 1–4.8)
MAGNESIUM SERPL-MCNC: 2 MG/DL (ref 1.6–2.6)
MCH RBC QN AUTO: 31.4 PG (ref 27–31)
MCHC RBC AUTO-ENTMCNC: 33.8 G/DL (ref 32–36)
MCV RBC AUTO: 93 FL (ref 82–98)
NUCLEATED RBC (/100WBC) (OHS): 0 /100 WBC
PLATELET # BLD AUTO: 309 K/UL (ref 150–450)
PMV BLD AUTO: 9 FL (ref 9.2–12.9)
POTASSIUM SERPL-SCNC: 4.1 MMOL/L (ref 3.5–5.1)
PROT SERPL-MCNC: 6.8 GM/DL (ref 6–8.4)
RBC # BLD AUTO: 3.85 M/UL (ref 4.6–6.2)
RELATIVE EOSINOPHIL (OHS): 5.6 %
RELATIVE LYMPHOCYTE (OHS): 18.9 % (ref 18–48)
RELATIVE MONOCYTE (OHS): 13.5 % (ref 4–15)
RELATIVE NEUTROPHIL (OHS): 60.8 % (ref 38–73)
SODIUM SERPL-SCNC: 138 MMOL/L (ref 136–145)
VANCOMYCIN TROUGH SERPL-MCNC: 10.5 UG/ML (ref 10–22)
WBC # BLD AUTO: 7.26 K/UL (ref 3.9–12.7)

## 2025-07-17 PROCEDURE — 94761 N-INVAS EAR/PLS OXIMETRY MLT: CPT

## 2025-07-17 PROCEDURE — 21400001 HC TELEMETRY ROOM

## 2025-07-17 PROCEDURE — 80202 ASSAY OF VANCOMYCIN: CPT | Performed by: PHYSICIAN ASSISTANT

## 2025-07-17 PROCEDURE — 99024 POSTOP FOLLOW-UP VISIT: CPT | Mod: ,,, | Performed by: SURGERY

## 2025-07-17 PROCEDURE — 85025 COMPLETE CBC W/AUTO DIFF WBC: CPT | Performed by: NURSE PRACTITIONER

## 2025-07-17 PROCEDURE — 80053 COMPREHEN METABOLIC PANEL: CPT | Performed by: NURSE PRACTITIONER

## 2025-07-17 PROCEDURE — 36415 COLL VENOUS BLD VENIPUNCTURE: CPT | Performed by: NURSE PRACTITIONER

## 2025-07-17 PROCEDURE — 83735 ASSAY OF MAGNESIUM: CPT | Performed by: NURSE PRACTITIONER

## 2025-07-17 PROCEDURE — 63600175 PHARM REV CODE 636 W HCPCS: Performed by: PHYSICIAN ASSISTANT

## 2025-07-17 PROCEDURE — 25000003 PHARM REV CODE 250: Performed by: PHYSICIAN ASSISTANT

## 2025-07-17 RX ADMIN — VANCOMYCIN HYDROCHLORIDE 1250 MG: 1.25 INJECTION, POWDER, LYOPHILIZED, FOR SOLUTION INTRAVENOUS at 09:07

## 2025-07-17 RX ADMIN — VANCOMYCIN HYDROCHLORIDE 1250 MG: 1.25 INJECTION, POWDER, LYOPHILIZED, FOR SOLUTION INTRAVENOUS at 06:07

## 2025-07-17 RX ADMIN — CEFTRIAXONE 1 G: 1 INJECTION, POWDER, FOR SOLUTION INTRAMUSCULAR; INTRAVENOUS at 05:07

## 2025-07-17 RX ADMIN — VANCOMYCIN HYDROCHLORIDE 1250 MG: 1.25 INJECTION, POWDER, LYOPHILIZED, FOR SOLUTION INTRAVENOUS at 03:07

## 2025-07-17 NOTE — ASSESSMENT & PLAN NOTE
- Continue ceftriaxone, vancomycin; wound care, surgery consults. Elevate extremity. Anticipate drain removal and if culture results finalize can likely discharge with PO options.

## 2025-07-17 NOTE — CONSULTS
[facility] ([address])  Wound Care    Patient Name:  Mike Yadav   MRN:  [id number]  Date: 7/17/2025  Diagnosis: Cellulitis and abscess of right leg    History:     Past Medical History:   Diagnosis Date    Concussion 2019       Social History[1]    Precautions:     Allergies as of 07/16/2025 - Reviewed 07/16/2025   Allergen Reaction Noted    Amoxicillin Hives 07/14/2025       WO Assessment Details/Treatment     Wound care consult received for assessment of right lower leg wound s/p I& D with Dr. Robles on 7/15. Packing and penrose drain was left in place. He presents with pain and swelling. He still has some erythema. He has normal white count. No fevers. He is on oral antibiotics at home.     Seen patient bedside with Dr. Campo and Dr. Ibrahim. Penrose drain removed. Upon assessment noted full thickness wound with clean red wound bed. No purulence or odor noted. Irrigated wound with Vashe and repacked with Aquacel Ag Hydrofiber ribbon and covered with foam dressing.     Recommend changing dressing every 2 days or prn: cleanse with Vashe and pack wound bed with Aquacel Ag Hydrofiber ribbon and cover with ABD pads/ medipore tape.      Discussed wound care packing instructions with patient. Patient reported that he would be comfortable changing the dressing himself. Will leave supplies for patient to take upon discharge. Nursing and MD team notified. Orders placed. Wound care following.        07/17/25 1124        Wound 07/14/25 1200 Incision Right anterior Leg   Date First Assessed/Time First Assessed: 07/14/25 1200   Present on Original Admission: Yes  Primary Wound Type: Incision  Side: Right  Orientation: anterior  Location: Leg  Closure Method: Sutures   Dressing Appearance Dry;Intact;Moist drainage   Drainage Amount Small   Drainage Characteristics/Odor Serosanguineous   Appearance Red;Moist   Red (%), Wound Tissue Color 100 %   Periwound Area Intact;Redness   Wound Edges Open   Wound  Length (cm) 1 cm   Wound Width (cm) 1.5 cm   Wound Surface Area (cm^2) 1.18 cm^2   Care Cleansed with:;Wound cleanser   Dressing Applied;Foam   Packing packed with;hydrofiber/alginate   Dressing Change Due 25     Before packing and penrose removal         2025         [1]   Social History  Socioeconomic History    Marital status: Single   Tobacco Use    Smoking status: Former     Current packs/day: 0.00     Types: Cigarettes     Quit date: 2019     Years since quittin.1    Smokeless tobacco: Never   Substance and Sexual Activity    Alcohol use: Yes     Comment: 3-5 drinks per day    Drug use: Yes     Types: Marijuana     Social Drivers of Health     Financial Resource Strain: Medium Risk (2025)    Overall Financial Resource Strain (CARDIA)     Difficulty of Paying Living Expenses: Somewhat hard   Food Insecurity: No Food Insecurity (2025)    Hunger Vital Sign     Worried About Running Out of Food in the Last Year: Never true     Ran Out of Food in the Last Year: Never true   Transportation Needs: No Transportation Needs (2025)    PRAPARE - Transportation     Lack of Transportation (Medical): No     Lack of Transportation (Non-Medical): No   Physical Activity: Sufficiently Active (7/15/2025)    Exercise Vital Sign     Days of Exercise per Week: 6 days     Minutes of Exercise per Session: 150+ min   Stress: No Stress Concern Present (2025)    [employer] [place] [employer] - Occupational Stress Questionnaire     Feeling of Stress : Not at all   Housing Stability: Low Risk  (2025)    Housing Stability Vital Sign     Unable to Pay for Housing in the Last Year: No     Homeless in the Last Year: No

## 2025-07-17 NOTE — SUBJECTIVE & OBJECTIVE
Past Medical History:   Diagnosis Date    Concussion 2019       Past Surgical History:   Procedure Laterality Date    INCISION AND DRAINAGE OF ABSCESS Right 7/15/2025    Procedure: INCISION AND DRAINAGE, ABSCESS;  Surgeon: Rashad Robles MD;  Location: [facility];  Service: General;  Laterality: Right;    LEG SURGERY Bilateral     R femur fx and L tib/fib fx repair    NOSE SURGERY      Fracture repair in 2019       Review of patient's allergies indicates:   Allergen Reactions    Amoxicillin Hives       Current Facility-Administered Medications on File Prior to Encounter   Medication    [DISCONTINUED] acetaminophen tablet 650 mg    [DISCONTINUED] bisacodyL suppository 10 mg    [DISCONTINUED] cefTRIAXone injection 1 g    [DISCONTINUED] dextrose 50% injection 12.5 g    [DISCONTINUED] dextrose 50% injection 25 g    [DISCONTINUED] enoxaparin injection 40 mg    [DISCONTINUED] glucagon (human recombinant) injection 1 mg    [DISCONTINUED] glucose chewable tablet 16 g    [DISCONTINUED] glucose chewable tablet 24 g    [DISCONTINUED] insulin aspart U-100 pen 0-5 Units    [DISCONTINUED] melatonin tablet 6 mg    [DISCONTINUED] ondansetron injection 4 mg    [DISCONTINUED] polyethylene glycol packet 17 g    [DISCONTINUED] prochlorperazine injection Soln 5 mg    [DISCONTINUED] sodium chloride 0.9% flush 10 mL    [DISCONTINUED] vancomycin - pharmacy to dose    [DISCONTINUED] vancomycin 1,250 mg in 0.9% NaCl 250 mL IVPB (admixture device)     Current Outpatient Medications on File Prior to Encounter   Medication Sig    cefdinir (OMNICEF) 300 MG capsule Take 1 capsule (300 mg total) by mouth 2 (two) times daily. for 14 days    EScitalopram oxalate (LEXAPRO) 10 MG tablet Take 10 mg by mouth once daily.    ibuprofen (ADVIL,MOTRIN) 200 MG tablet Take 3-4 tablets (600-800 mg total) by mouth 3 (three) times daily as needed for Pain.    sulfamethoxazole-trimethoprim 800-160mg (BACTRIM DS) 800-160 mg Tab Take 1 tablet by mouth 2 (two)  times daily. for 14 days     Family History       Problem Relation (Age of Onset)    Multiple sclerosis Paternal Uncle    No Known Problems Mother, Father, Sister          Tobacco Use    Smoking status: Former     Current packs/day: 0.00     Types: Cigarettes     Quit date: 2019     Years since quittin.1    Smokeless tobacco: Never   Substance and Sexual Activity    Alcohol use: Yes     Comment: 3-5 drinks per day    Drug use: Yes     Types: Marijuana    Sexual activity: Not on file     Review of Systems   Constitutional:  Negative for activity change, appetite change, chills, fatigue and fever.   Respiratory:  Negative for chest tightness, shortness of breath and wheezing.    Cardiovascular:  Negative for chest pain, palpitations and leg swelling.   Gastrointestinal:  Negative for abdominal pain, constipation, diarrhea, nausea and vomiting.   Genitourinary:  Negative for difficulty urinating, dysuria and hematuria.   Skin:  Positive for wound.   Neurological:  Negative for dizziness, light-headedness and headaches.     Objective:     Vital Signs (Most Recent):  Temp: 98 °F (36.7 °C) (25)  Pulse: 68 (25)  Resp: 18 (25)  BP: 126/66 (25)  SpO2: 96 % (25) Vital Signs (24h Range):  Temp:  [98 °F (36.7 °C)-99.7 °F (37.6 °C)] 98 °F (36.7 °C)  Pulse:  [65-77] 68  Resp:  [18-20] 18  SpO2:  [96 %-98 %] 96 %  BP: (126-143)/(66-72) 126/66     Weight: 79.4 kg (175 lb)  Body mass index is 25.84 kg/m².     Physical Exam  Vitals and nursing note reviewed.   Constitutional:       General: He is not in acute distress.     Appearance: Normal appearance. He is well-developed and normal weight. He is not ill-appearing or toxic-appearing.   HENT:      Head: Normocephalic and atraumatic.      Mouth/Throat:      Dentition: Normal dentition.   Eyes:      General: Lids are normal.      Extraocular Movements: Extraocular movements intact.      Conjunctiva/sclera: Conjunctivae  normal.   Cardiovascular:      Rate and Rhythm: Normal rate and regular rhythm.   Pulmonary:      Effort: Pulmonary effort is normal.   Abdominal:      General: There is no distension.      Palpations: Abdomen is soft.      Tenderness: There is no abdominal tenderness.   Musculoskeletal:      Cervical back: Neck supple.      Right lower leg: No edema.      Left lower leg: No edema.   Skin:     General: Skin is warm and dry.      Findings: Lesion: see media.   Neurological:      Mental Status: He is alert and oriented to person, place, and time.             Significant Labs: All pertinent labs within the past 24 hours have been reviewed.  CBC:   Recent Labs   Lab 07/15/25  0352 07/16/25  1632   WBC 13.08* 10.48   HGB 12.5* 11.2*   HCT 37.5* 34.7*    323     CMP:   Recent Labs   Lab 07/15/25  0352 07/16/25  1632    138   K 3.8 4.1    103   CO2 21* 22*    83   BUN 10 9   CREATININE 0.7 1.0   CALCIUM 8.8 8.8   PROT 7.1 7.2   ALBUMIN 3.3* 3.4*   BILITOT 0.8 <0.5   ALKPHOS 88 79   AST 14 15   ALT 10 10   ANIONGAP 13 13     Urine Studies:   Recent Labs   Lab 07/16/25  1749   COLORU Yellow   APPEARANCEUA Clear   PHUR 7.0   SPECGRAV 1.020   PROTEINUA Negative   GLUCUA Negative   BILIRUBINUA Negative   OCCULTUA Negative   NITRITE Negative   UROBILINOGEN Negative   LEUKOCYTESUR Negative     Microbiology Results (last 7 days)       Procedure Component Value Units Date/Time    Blood culture x two cultures. Draw prior to antibiotics. [[id number]] Collected: 07/16/25 1710    Order Status: Sent Specimen: Blood from Peripheral, Antecubital, Right Updated: 07/16/25 1743    Blood culture x two cultures. Draw prior to antibiotics. [[id number]] Collected: 07/16/25 1730    Order Status: Sent Specimen: Blood from Peripheral, Antecubital, Left Updated: 07/16/25 1743              Significant Imaging: I have reviewed all pertinent imaging results/findings within the past 24 hours.

## 2025-07-17 NOTE — PLAN OF CARE
Case Management Assessment     PCP: Jayesh Parks     Patient Arrived From: Home  Existing Help at Home: Self, Independent    Barriers to Discharge: None    Discharge Plan:    A. Home w/family   B. Home      Patient AAOx3, independent at baseline. Denies owning any DME. Will drive self home on discharge. PCP correct on facesheet.     07/17/25 1248   Discharge Assessment   Assessment Type Discharge Planning Assessment   Confirmed/corrected address, phone number and insurance Yes   Confirmed Demographics Correct on Facesheet   Source of Information patient   Communicated KADEN with patient/caregiver Date not available/Unable to determine   People in Home significant other   Name(s) of People in Home Drea Mcdaniel (Significant other)  [phone] (Mobile)   Do you expect to return to your current living situation? Yes   Do you have help at home or someone to help you manage your care at home? Yes   Who are your caregiver(s) and their phone number(s)? Drea Mcdaniel (Significant other)  [phone] (Mobile)   Prior to hospitilization cognitive status: Alert/Oriented   Current cognitive status: Alert/Oriented   Walking or Climbing Stairs Difficulty no   Dressing/Bathing Difficulty no   Equipment Currently Used at Home none   Readmission within 30 days? Yes   Do you currently have service(s) that help you manage your care at home? No   Do you take prescription medications? Yes   Do you have prescription coverage? Yes   Do you have any problems affording any of your prescribed medications? No   Is the patient taking medications as prescribed? yes   Who is going to help you get home at discharge? Drea Mcdaniel (Significant other)  [phone] (Mobile)   How do you get to doctors appointments? car, drives self   Are you on dialysis? No   Do you take coumadin? No   Discharge Plan A Home with family   Discharge Plan B Home   DME Needed Upon Discharge  none   Discharge Plan discussed with: Patient   Transition of Care  Barriers None     [facility] ([address])  Initial Discharge Assessment       Primary Care Provider: Aliza, Primary Doctor    Admission Diagnosis: Swelling [R60.9]  Cellulitis and abscess of right leg [L03.115, L02.415]    Admission Date: 7/16/2025  Expected Discharge Date:     Transition of Care Barriers: (P) None    Payor: BLUE CROSS BLUE SHIELD / Plan: BCBS OF [facility] LOCAL PLUS / Product Type: Commercial /     Extended Emergency Contact Information  Primary Emergency Contact: JonasDrea  Mobile Phone: [phone]  Relation: Significant other  Secondary Emergency Contact: VicentaGautam  Mobile Phone: [phone]  Relation: Father    Discharge Plan A: (P) Home with family  Discharge Plan B: (P) Home      [facility] [address] [place], [place] - [address] [facility]  [address] [facility] [address]  Phone: [phone] Fax: [phone]    [id number] [place], [place] - [address] [place] [address]  [address]  [facility] [address]  Phone: [phone] Fax: [phone]    [id number] [place], [place] - [address] AT [address]  [address]  [facility] [address]  Phone: [phone] Fax: [phone]      Initial Assessment (most recent)       Adult Discharge Assessment - 07/17/25 1248          Discharge Assessment    Assessment Type Discharge Planning Assessment (P)      Confirmed/corrected address, phone number and insurance Yes (P)      Confirmed Demographics Correct on Facesheet (P)      Source of Information patient (P)      Communicated KADEN with patient/caregiver Date not available/Unable to determine (P)      People in Home significant other (P)      Name(s) of People in Home McdanielDrea (Significant other)  [phone] (Mobile) (P)      Do you expect to return to your current living situation? Yes (P)      Do you have help at home or someone to  help you manage your care at home? Yes (P)      Who are your caregiver(s) and their phone number(s)? Drea Mcdaniel (Significant other)  [phone] (Mobile) (P)      Prior to hospitilization cognitive status: Alert/Oriented (P)      Current cognitive status: Alert/Oriented (P)      Walking or Climbing Stairs Difficulty no (P)      Dressing/Bathing Difficulty no (P)      Equipment Currently Used at Home none (P)      Readmission within 30 days? Yes (P)      Do you currently have service(s) that help you manage your care at home? No (P)      Do you take prescription medications? Yes (P)      Do you have prescription coverage? Yes (P)      Do you have any problems affording any of your prescribed medications? No (P)      Is the patient taking medications as prescribed? yes (P)      Who is going to help you get home at discharge? Drea Mcdaniel (Significant other)  [phone] (Mobile) (P)      How do you get to doctors appointments? car, drives self (P)      Are you on dialysis? No (P)      Do you take coumadin? No (P)      Discharge Plan A Home with family (P)      Discharge Plan B Home (P)      DME Needed Upon Discharge  none (P)      Discharge Plan discussed with: Patient (P)      Transition of Care Barriers None (P)

## 2025-07-17 NOTE — H&P
"[address] Medicine  History & Physical    Patient Name: Mike Yadav  MRN: [id number]  Patient Class: IP- Inpatient  Admission Date: 7/16/2025  Attending Physician: STEPHEN Pradhan MD   Primary Care Provider: Aliza, Primary Doctor    Patient information was obtained from patient, past medical records, and ER records.     Subjective:     Principal Problem:Cellulitis and abscess of right leg    Chief Complaint:   Chief Complaint   Patient presents with    Wound Check     D/c yesterday & today has increased edema and reddness to rle, right ankle & right foot        HPI: Mr. 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 He treated at home initially with wound closure then the are reopened and wound returned at which time he was seen at a pharmacy clinic around 6/26/25 and received IM injection and PO doxy prescription and reported wound healed then unfortunately reopened prompting ED evaluation and admission 7/14-15/2025 at [facility], see below for recent admission details.     He was admitted 7/14-15/2025 due to "RLE cellulitis and started on ceftriaxone, vancomycin. Surgery consulted and underwent I&D 07/15. Preliminary blood cultures negative and intra-operative cultures collected. Patient reported he was unable to remain in hospital for the following day; discussed treatment options and ultimately plan for 14 day course of cefdinir and TMP-SMX. Referred for general surgery and internal medicine follow-up after discharge". CT of right leg during hospital stay noted "no evidence of osteomyelitis. Fluid collection with rim enhancement and small gas bubbles anterior to the distal tibia concerning for an abscess collection. Generalized edema and fluid tracking within the soft tissues of the right leg."    He presents to ED tonight due to " increased pain and swelling to RLE with drainage saturating dressing despite taking home PO ABXs. He denies any further injury since leaving hospital yesterday. He denies fever, chills, diaphoresis, SOB, MCELROY, CP, abdominal pain, N/V/D, constipation, urinary symptoms or hematuria, blood in stool, decreased appetite, changes in PO intake, light headiness, dizziness, or headache. He lives with family, is independent ADLs, denies ambulatory aid use.  He reports daily marijuana use, daily alcohol intake; denies tobacco use, denies illicit drug use and > 7 months.    In the ED he is HDS and afebrile.  CBC with WBC 10., H/H 11.2/35, platelets 323.  Chemistry , K 4.1, chloride 103, CO2 22, BUN 9, SCr 1.0, glucose 83.  LFTs unremarkable.  POCT lactic 1.19.  .  UA noninfectious.  Blood cultures in process.    Right foot x-ray :  No acute fracture or dislocation of the foot.   Radiopaque density within the soft tissues of the lower leg seen on the oblique view.  Please correlate with any recent procedure or packing material within the soft tissues.     The patient was admitted to the Hospital Medicine Service for further evaluation and management.     Past Medical History:   Diagnosis Date    Concussion 2019       Past Surgical History:   Procedure Laterality Date    INCISION AND DRAINAGE OF ABSCESS Right 7/15/2025    Procedure: INCISION AND DRAINAGE, ABSCESS;  Surgeon: Rashad Robles MD;  Location: [facility];  Service: General;  Laterality: Right;    LEG SURGERY Bilateral     R femur fx and L tib/fib fx repair    NOSE SURGERY      Fracture repair in 2019       Review of patient's allergies indicates:   Allergen Reactions    Amoxicillin Hives       Current Facility-Administered Medications on File Prior to Encounter   Medication    [DISCONTINUED] acetaminophen tablet 650 mg    [DISCONTINUED] bisacodyL suppository 10 mg    [DISCONTINUED] cefTRIAXone injection 1 g    [DISCONTINUED] dextrose 50% injection  12.5 g    [DISCONTINUED] dextrose 50% injection 25 g    [DISCONTINUED] enoxaparin injection 40 mg    [DISCONTINUED] glucagon (human recombinant) injection 1 mg    [DISCONTINUED] glucose chewable tablet 16 g    [DISCONTINUED] glucose chewable tablet 24 g    [DISCONTINUED] insulin aspart U-100 pen 0-5 Units    [DISCONTINUED] melatonin tablet 6 mg    [DISCONTINUED] ondansetron injection 4 mg    [DISCONTINUED] polyethylene glycol packet 17 g    [DISCONTINUED] prochlorperazine injection Soln 5 mg    [DISCONTINUED] sodium chloride 0.9% flush 10 mL    [DISCONTINUED] vancomycin - pharmacy to dose    [DISCONTINUED] vancomycin 1,250 mg in 0.9% NaCl 250 mL IVPB (admixture device)     Current Outpatient Medications on File Prior to Encounter   Medication Sig    cefdinir (OMNICEF) 300 MG capsule Take 1 capsule (300 mg total) by mouth 2 (two) times daily. for 14 days    EScitalopram oxalate (LEXAPRO) 10 MG tablet Take 10 mg by mouth once daily.    ibuprofen (ADVIL,MOTRIN) 200 MG tablet Take 3-4 tablets (600-800 mg total) by mouth 3 (three) times daily as needed for Pain.    sulfamethoxazole-trimethoprim 800-160mg (BACTRIM DS) 800-160 mg Tab Take 1 tablet by mouth 2 (two) times daily. for 14 days     Family History       Problem Relation (Age of Onset)    Multiple sclerosis Paternal Uncle    No Known Problems Mother, Father, Sister          Tobacco Use    Smoking status: Former     Current packs/day: 0.00     Types: Cigarettes     Quit date: 2019     Years since quittin.1    Smokeless tobacco: Never   Substance and Sexual Activity    Alcohol use: Yes     Comment: 3-5 drinks per day    Drug use: Yes     Types: Marijuana    Sexual activity: Not on file     Review of Systems   Constitutional:  Negative for activity change, appetite change, chills, fatigue and fever.   Respiratory:  Negative for chest tightness, shortness of breath and wheezing.    Cardiovascular:  Negative for chest pain, palpitations and leg swelling.    Gastrointestinal:  Negative for abdominal pain, constipation, diarrhea, nausea and vomiting.   Genitourinary:  Negative for difficulty urinating, dysuria and hematuria.   Skin:  Positive for wound.   Neurological:  Negative for dizziness, light-headedness and headaches.     Objective:     Vital Signs (Most Recent):  Temp: 98 °F (36.7 °C) (07/16/25 2000)  Pulse: 68 (07/16/25 2010)  Resp: 18 (07/16/25 2000)  BP: 126/66 (07/16/25 2000)  SpO2: 96 % (07/16/25 2000) Vital Signs (24h Range):  Temp:  [98 °F (36.7 °C)-99.7 °F (37.6 °C)] 98 °F (36.7 °C)  Pulse:  [65-77] 68  Resp:  [18-20] 18  SpO2:  [96 %-98 %] 96 %  BP: (126-143)/(66-72) 126/66     Weight: 79.4 kg (175 lb)  Body mass index is 25.84 kg/m².     Physical Exam  Vitals and nursing note reviewed.   Constitutional:       General: He is not in acute distress.     Appearance: Normal appearance. He is well-developed and normal weight. He is not ill-appearing or toxic-appearing.   HENT:      Head: Normocephalic and atraumatic.      Mouth/Throat:      Dentition: Normal dentition.   Eyes:      General: Lids are normal.      Extraocular Movements: Extraocular movements intact.      Conjunctiva/sclera: Conjunctivae normal.   Cardiovascular:      Rate and Rhythm: Normal rate and regular rhythm.   Pulmonary:      Effort: Pulmonary effort is normal.   Abdominal:      General: There is no distension.      Palpations: Abdomen is soft.      Tenderness: There is no abdominal tenderness.   Musculoskeletal:      Cervical back: Neck supple.      Right lower leg: No edema.      Left lower leg: No edema.   Skin:     General: Skin is warm and dry.      Findings: Lesion: see media.   Neurological:      Mental Status: He is alert and oriented to person, place, and time.             Significant Labs: All pertinent labs within the past 24 hours have been reviewed.  CBC:   Recent Labs   Lab 07/15/25  0352 07/16/25  1632   WBC 13.08* 10.48   HGB 12.5* 11.2*   HCT 37.5* 34.7*    323      CMP:   Recent Labs   Lab 07/15/25  0352 07/16/25  1632    138   K 3.8 4.1    103   CO2 21* 22*    83   BUN 10 9   CREATININE 0.7 1.0   CALCIUM 8.8 8.8   PROT 7.1 7.2   ALBUMIN 3.3* 3.4*   BILITOT 0.8 <0.5   ALKPHOS 88 79   AST 14 15   ALT 10 10   ANIONGAP 13 13     Urine Studies:   Recent Labs   Lab 07/16/25  1749   COLORU Yellow   APPEARANCEUA Clear   PHUR 7.0   SPECGRAV 1.020   PROTEINUA Negative   GLUCUA Negative   BILIRUBINUA Negative   OCCULTUA Negative   NITRITE Negative   UROBILINOGEN Negative   LEUKOCYTESUR Negative     Microbiology Results (last 7 days)       Procedure Component Value Units Date/Time    Blood culture x two cultures. Draw prior to antibiotics. [[id number]] Collected: 07/16/25 1710    Order Status: Sent Specimen: Blood from Peripheral, Antecubital, Right Updated: 07/16/25 1743    Blood culture x two cultures. Draw prior to antibiotics. [[id number]] Collected: 07/16/25 1730    Order Status: Sent Specimen: Blood from Peripheral, Antecubital, Left Updated: 07/16/25 2653              Significant Imaging: I have reviewed all pertinent imaging results/findings within the past 24 hours.  Assessment/Plan:     Assessment & Plan  Cellulitis and abscess of right leg  -admitted D/T RLE abscess/cellulitis with history of surgical I&D 7/15/2025  -at admission hemoglobin stable and afebrile   -ED workup detailed above   -recent blood cultures from 07/14/2025 with no growth to date and wound culture from 07/15/2025 with many Staph aureus   -repeat blood cultures drawn in ED today >>> please follow   -continue vancomycin and ceftriaxone initiated in ED    -tailor/de-escalate as appropriate   -Wound Care consulted   -consider General Surgery consult in AM if indicated   -PRN supportive care as indicated   -elevate RLE when in bed/recliner  -fall precautions  -trend labs, address/replete electrolytes as indicated    VTE Risk Mitigation (From admission, onward)           Ordered      enoxaparin injection 40 mg  Daily         07/16/25 1858     IP VTE HIGH RISK PATIENT  Once         07/16/25 1858     Place sequential compression device  Until discontinued         07/16/25 1858                      Keyana Almanza DNP, AG-ACNP, BC  Department of Hospital Medicine  [facility]

## 2025-07-17 NOTE — HPI
39M with RLE abscess operatively drained with Dr. Robles on 7/15. Packing a penrose drain was left in place. He presents with pain and swelling. He still has some erythema. He has normal white count. No fevers. He is on oral antibiotics at home.  Surgery consulted.

## 2025-07-17 NOTE — HPI
"Mr. 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 He treated at home initially with wound closure then the are reopened and wound returned at which time he was seen at a pharmacy clinic around 6/26/25 and received IM injection and PO doxy prescription and reported wound healed then unfortunately reopened prompting ED evaluation and admission 7/14-15/2025 at [facility], see below for recent admission details.     He was admitted 7/14-15/2025 due to "RLE cellulitis and started on ceftriaxone, vancomycin. Surgery consulted and underwent I&D 07/15. Preliminary blood cultures negative and intra-operative cultures collected. Patient reported he was unable to remain in hospital for the following day; discussed treatment options and ultimately plan for 14 day course of cefdinir and TMP-SMX. Referred for general surgery and internal medicine follow-up after discharge". CT of right leg during hospital stay noted "no evidence of osteomyelitis. Fluid collection with rim enhancement and small gas bubbles anterior to the distal tibia concerning for an abscess collection. Generalized edema and fluid tracking within the soft tissues of the right leg."    He presents to ED tonight due to increased pain and swelling to RLE with drainage saturating dressing despite taking home PO ABXs. He denies any further injury since leaving hospital yesterday. He denies fever, chills, diaphoresis, SOB, MCELROY, CP, abdominal pain, N/V/D, constipation, urinary symptoms or hematuria, blood in stool, decreased appetite, changes in PO intake, light headiness, dizziness, or headache. He lives with family, is independent ADLs, denies ambulatory aid use.  He reports daily marijuana use, daily alcohol intake; denies tobacco use, denies illicit drug use and > 7 months.    In the ED he is HDS and afebrile.  CBC with " WBC 10., H/H 11.2/35, platelets 323.  Chemistry , K 4.1, chloride 103, CO2 22, BUN 9, SCr 1.0, glucose 83.  LFTs unremarkable.  POCT lactic 1.19.  .  UA noninfectious.  Blood cultures in process.    Right foot x-ray :  No acute fracture or dislocation of the foot.   Radiopaque density within the soft tissues of the lower leg seen on the oblique view.  Please correlate with any recent procedure or packing material within the soft tissues.     The patient was admitted to the Hospital Medicine Service for further evaluation and management.

## 2025-07-17 NOTE — ASSESSMENT & PLAN NOTE
39M s/p I/D of RLE on 7/15 here with pain and swelling. No fevers or white count.     -See with wound care at bedside. Penrose and packing removed. Looks clean and no residual collections.   - Discussed with pt plan to re pack with wound care and change every couple days  - elevate leg for swelling  - Will need oral antibiotic regimen for ongoing cellulitis  - Okay to discharge later today and tomorrow as long as he feels comfortable changing the packing  - he can follow-up in the surgery office  -Please call with questions

## 2025-07-17 NOTE — CONSULTS
[facility] ([address])  General Surgery  Consult Note    Patient Name: Mike Yadav  MRN: [id number]  Code Status: Full Code  Admission Date: 7/16/2025  Hospital Length of Stay: 1 days  Attending Physician: STEPHEN Pradhan MD  Primary Care Provider: Aliza, Primary Doctor    Patient information was obtained from patient, past medical records, and ER records.     Inpatient consult to General Surgery  Consult performed by: Bonifacio Ibrahim MD  Consult ordered by: STEPHEN Pradhan MD        Subjective:     Principal Problem: Cellulitis and abscess of right leg    History of Present Illness: 39M with RLE abscess operatively drained with Dr. Robles on 7/15. Packing a penrose drain was left in place. He presents with pain and swelling. He still has some erythema. He has normal white count. No fevers. He is on oral antibiotics at home.  Surgery consulted.     No current facility-administered medications on file prior to encounter.     Current Outpatient Medications on File Prior to Encounter   Medication Sig    cefdinir (OMNICEF) 300 MG capsule Take 1 capsule (300 mg total) by mouth 2 (two) times daily. for 14 days    EScitalopram oxalate (LEXAPRO) 10 MG tablet Take 10 mg by mouth once daily.    ibuprofen (ADVIL,MOTRIN) 200 MG tablet Take 3-4 tablets (600-800 mg total) by mouth 3 (three) times daily as needed for Pain.    sulfamethoxazole-trimethoprim 800-160mg (BACTRIM DS) 800-160 mg Tab Take 1 tablet by mouth 2 (two) times daily. for 14 days       Review of patient's allergies indicates:   Allergen Reactions    Amoxicillin Hives       Past Medical History:   Diagnosis Date    Concussion 2019     Past Surgical History:   Procedure Laterality Date    INCISION AND DRAINAGE OF ABSCESS Right 7/15/2025    Procedure: INCISION AND DRAINAGE, ABSCESS;  Surgeon: Rashad Robles MD;  Location: [facility];  Service: General;  Laterality: Right;    LEG SURGERY Bilateral     R femur fx and L tib/fib fx repair     NOSE SURGERY      Fracture repair in 2019     Family History       Problem Relation (Age of Onset)    Multiple sclerosis Paternal Uncle    No Known Problems Mother, Father, Sister          Tobacco Use    Smoking status: Former     Current packs/day: 0.00     Types: Cigarettes     Quit date: 2019     Years since quittin.1    Smokeless tobacco: Never   Substance and Sexual Activity    Alcohol use: Yes     Comment: 3-5 drinks per day    Drug use: Yes     Types: Marijuana    Sexual activity: Not on file     Review of Systems   Musculoskeletal:         Pain and swelling RLE     Objective:     Vital Signs (Most Recent):  Temp: 98.1 °F (36.7 °C) (25)  Pulse: (!) 53 (25)  Resp: 18 (25)  BP: (!) 129/56 (25)  SpO2: 98 % (25) Vital Signs (24h Range):  Temp:  [98 °F (36.7 °C)-99.7 °F (37.6 °C)] 98.1 °F (36.7 °C)  Pulse:  [50-77] 53  Resp:  [18-20] 18  SpO2:  [95 %-98 %] 98 %  BP: (111-143)/(56-72) 129/56     Weight: 79.5 kg (175 lb 4.8 oz)  Body mass index is 25.89 kg/m².     Physical Exam  Vitals reviewed.   Constitutional:       General: He is not in acute distress.  HENT:      Head: Normocephalic and atraumatic.      Nose: Nose normal.   Eyes:      General:         Right eye: No discharge.         Left eye: No discharge.   Cardiovascular:      Rate and Rhythm: Normal rate and regular rhythm.   Pulmonary:      Effort: Pulmonary effort is normal. No respiratory distress.      Breath sounds: No stridor.   Musculoskeletal:         General: Normal range of motion.      Cervical back: Normal range of motion.   Skin:     General: Skin is warm and dry.      Capillary Refill: Capillary refill takes 2 to 3 seconds.      Comments: RLE some erythema and swelling. Packing and penrose removed. No evidence of residual collection and no pus.     Functional RLE    Mild tenderness    Neurological:      General: No focal deficit present.      Mental Status: He is alert and  oriented to person, place, and time.   Psychiatric:         Mood and Affect: Mood normal.         Behavior: Behavior normal.            I have reviewed all pertinent lab results within the past 24 hours.    Significant Diagnostics:  I have reviewed all pertinent imaging results/findings within the past 24 hours.    Assessment/Plan:     * Cellulitis and abscess of right leg  39M s/p I/D of RLE on 7/15 here with pain and swelling. No fevers or white count.     -See with wound care at bedside. Penrose and packing removed. Looks clean and no residual collections.   - Discussed with pt plan to re pack with wound care and change every couple days  - elevate leg for swelling  - Will need oral antibiotic regimen for ongoing cellulitis  - Okay to discharge later today and tomorrow as long as he feels comfortable changing the packing  - he can follow-up in the surgery office  -Please call with questions          VTE Risk Mitigation (From admission, onward)           Ordered     enoxaparin injection 40 mg  Daily         07/16/25 1858     IP VTE HIGH RISK PATIENT  Once         07/16/25 1858     Place sequential compression device  Until discontinued         07/16/25 1858                    Thank you for your consult. I will follow-up with patient. Please contact us if you have any additional questions.    Bonifacio Ibrahim MD  General Surgery  [facility] ([address])

## 2025-07-17 NOTE — PROGRESS NOTES
"[address] Medicine  Progress Note    Patient Name: Mike Yadav  MRN: [id number]  Patient Class: IP- Inpatient   Admission Date: 7/16/2025  Length of Stay: 1 days  Attending Physician: STEPHEN Pradhan MD  Primary Care Provider: Jayesh Parks NP        Subjective     Principal Problem:Cellulitis and abscess of right leg        HPI:  Mr. 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 He treated at home initially with wound closure then the are reopened and wound returned at which time he was seen at a pharmacy clinic around 6/26/25 and received IM injection and PO doxy prescription and reported wound healed then unfortunately reopened prompting ED evaluation and admission 7/14-15/2025 at [facility], see below for recent admission details.     He was admitted 7/14-15/2025 due to "RLE cellulitis and started on ceftriaxone, vancomycin. Surgery consulted and underwent I&D 07/15. Preliminary blood cultures negative and intra-operative cultures collected. Patient reported he was unable to remain in hospital for the following day; discussed treatment options and ultimately plan for 14 day course of cefdinir and TMP-SMX. Referred for general surgery and internal medicine follow-up after discharge". CT of right leg during hospital stay noted "no evidence of osteomyelitis. Fluid collection with rim enhancement and small gas bubbles anterior to the distal tibia concerning for an abscess collection. Generalized edema and fluid tracking within the soft tissues of the right leg."    He presents to ED tonight due to increased pain and swelling to RLE with drainage saturating dressing despite taking home PO ABXs. He denies any further injury since leaving hospital yesterday. He denies fever, chills, diaphoresis, SOB, MCELROY, CP, abdominal pain, " N/V/D, constipation, urinary symptoms or hematuria, blood in stool, decreased appetite, changes in PO intake, light headiness, dizziness, or headache. He lives with family, is independent ADLs, denies ambulatory aid use.  He reports daily marijuana use, daily alcohol intake; denies tobacco use, denies illicit drug use and > 7 months.    In the ED he is HDS and afebrile.  CBC with WBC 10., H/H 11.2/35, platelets 323.  Chemistry , K 4.1, chloride 103, CO2 22, BUN 9, SCr 1.0, glucose 83.  LFTs unremarkable.  POCT lactic 1.19.  .  UA noninfectious.  Blood cultures in process.    Right foot x-ray :  No acute fracture or dislocation of the foot.   Radiopaque density within the soft tissues of the lower leg seen on the oblique view.  Please correlate with any recent procedure or packing material within the soft tissues.     The patient was admitted to the Hospital Medicine Service for further evaluation and management.     Overview/Hospital Course:  No notes on file    Interval History: No acute events overnight. Preliminary cultures from wound with staph. Discussed plan of care. No other concerns at this time.    Review of Systems   Constitutional:  Negative for chills and fever.   Respiratory:  Negative for cough and shortness of breath.    Cardiovascular:  Negative for chest pain and palpitations.   Gastrointestinal:  Negative for abdominal pain, nausea and vomiting.     Objective:     Vital Signs (Most Recent):  Temp: 97.5 °F (36.4 °C) (07/17/25 1947)  Pulse: (!) 52 (07/17/25 1947)  Resp: 16 (07/17/25 1947)  BP: 137/67 (07/17/25 1947)  SpO2: 99 % (07/17/25 1947) Vital Signs (24h Range):  Temp:  [97.5 °F (36.4 °C)-98.6 °F (37 °C)] 97.5 °F (36.4 °C)  Pulse:  [42-53] 52  Resp:  [16-20] 16  SpO2:  [95 %-100 %] 99 %  BP: (111-137)/(56-70) 137/67     Weight: 79.5 kg (175 lb 4.8 oz)  Body mass index is 25.89 kg/m².    Intake/Output Summary (Last 24 hours) at 7/17/2025 2141  Last data filed at 7/17/2025  1524  Gross per 24 hour   Intake 400 ml   Output 1 ml   Net 399 ml         Physical Exam  Vitals and nursing note reviewed.   Constitutional:       General: He is not in acute distress.     Appearance: He is well-developed.   HENT:      Head: Normocephalic and atraumatic.   Eyes:      General:         Right eye: No discharge.         Left eye: No discharge.      Conjunctiva/sclera: Conjunctivae normal.   Cardiovascular:      Rate and Rhythm: Normal rate.      Pulses: Normal pulses.   Pulmonary:      Effort: Pulmonary effort is normal. No respiratory distress.   Abdominal:      Palpations: Abdomen is soft.      Tenderness: There is no abdominal tenderness.   Musculoskeletal:         General: Normal range of motion.      Right lower leg: Edema present.      Left lower leg: No edema.      Comments: Improved edema from prior. Dressing in place to RLE.   Skin:     General: Skin is warm and dry.   Neurological:      Mental Status: He is alert and oriented to person, place, and time.           Significant Labs:   CBC:  Recent Labs   Lab 07/15/25  0352 07/16/25  1632 07/17/25  0353   WBC 13.08* 10.48 7.26   HGB 12.5* 11.2* 12.1*   HCT 37.5* 34.7* 35.8*    323 309   NEUTROAUTO 79.0* 76.2* 60.8   LYMPH 1.21  9.3* 1.16  11.1* 1.37  18.9   MONO 8.4  1.10* 9.1  0.95 13.5  0.98   EOS 2.4  0.32 2.8  0.29 5.6  0.41   BMP:  Recent Labs   Lab 07/15/25  0352 07/16/25  1632 07/17/25  0353    138 138   K 3.8 4.1 4.1    103 107   CO2 21* 22* 22*   BUN 10 9 7   CREATININE 0.7 1.0 0.7    83 108   CALCIUM 8.8 8.8 8.6*   MG 1.8  --  2.0   PHOS 3.7  --   --        Significant Imaging: I have reviewed and interpreted all pertinent imaging results/findings within the past 24 hours.        Assessment & Plan  Cellulitis and abscess of right leg  - Continue ceftriaxone, vancomycin; wound care, surgery consults. Elevate extremity. Anticipate drain removal and if culture results finalize can likely discharge with  PO options.    VTE Risk Mitigation (From admission, onward)           Ordered     enoxaparin injection 40 mg  Daily         07/16/25 1858     IP VTE HIGH RISK PATIENT  Once         07/16/25 1858     Place sequential compression device  Until discontinued         07/16/25 1858                    Discharge Planning   KADEN: 7/20/2025     Code Status: Full Code   Medical Readiness for Discharge Date:   Discharge Plan A: Home with family                        D Brayden Pradhan MD  Department of Hospital Medicine   [facility] Surg ([address])

## 2025-07-17 NOTE — PROGRESS NOTES
Pharmacokinetic Initial Assessment: IV Vancomycin    Assessment/Plan:    Initiate intravenous vancomycin with loading dose of 1250 mg once followed by a maintenance dose of vancomycin 1250mg IV every 8 hours  Desired empiric serum trough concentration is 10 to 20 mcg/mL  Draw vancomycin trough level 30 min prior to fourth dose on 7/17 at approximately 1230  Pharmacy will continue to follow and monitor vancomycin.      Please contact pharmacy at extension [phone] with any questions regarding this assessment.     Thank you for the consult,   Radha Mccauley       Patient brief summary:  Mike Yadav is a 39 y.o. male initiated on antimicrobial therapy with IV Vancomycin for treatment of suspected skin & soft tissue infection    Drug Allergies:   Review of patient's allergies indicates:   Allergen Reactions    Amoxicillin Hives       Actual Body Weight:   79.4 kg    Renal Function:   Estimated Creatinine Clearance: 101.1 mL/min (based on SCr of 1 mg/dL).,     Dialysis Method (if applicable):  N/A    CBC (last 72 hours):  Recent Labs   Lab Result Units 07/14/25  1226 07/15/25  0352 07/16/25  1632   WBC K/uL 13.31* 13.08* 10.48   HGB gm/dL 13.0* 12.5* 11.2*   HCT % 39.7* 37.5* 34.7*   Platelet Count K/uL 332 296 323   Lymph % % 8.8* 9.3* 11.1*   Mono % % 7.4 8.4 9.1   Eos % % 1.1 2.4 2.8   Basophil % % 0.5 0.5 0.4       Metabolic Panel (last 72 hours):  Recent Labs   Lab Result Units 07/14/25  1226 07/15/25  0352 07/16/25  1632 07/16/25  1749   Sodium mmol/L 137 138 138  --    Potassium mmol/L 3.8 3.8 4.1  --    Chloride mmol/L 101 104 103  --    CO2 mmol/L 25 21* 22*  --    Glucose mg/dL 89 101 83  --    Glucose, UA   --   --   --  Negative   BUN mg/dL 11 10 9  --    Creatinine mg/dL 0.8 0.7 1.0  --    Albumin g/dL 4.0 3.3* 3.4*  --    Bilirubin Total mg/dL 0.7 0.8 <0.5  --    ALP unit/L 94 88 79  --    AST unit/L 21 14 15  --    ALT unit/L 15 10 10  --    Magnesium  mg/dL  --  1.8  --   --    Phosphorus Level mg/dL   "--  3.7  --   --        Drug levels (last 3 results):  No results for input(s): "VANCOMYCINRA", "VANCORANDOM", "VANCOMYCINPE", "VANCOPEAK", "VANCOMYCINTR", "VANCOTROUGH" in the last 72 hours.    Microbiologic Results:  Microbiology Results (last 7 days)       Procedure Component Value Units Date/Time    Blood culture x two cultures. Draw prior to antibiotics. [[id number]] Collected: 07/16/25 1710    Order Status: Sent Specimen: Blood from Peripheral, Antecubital, Right Updated: 07/16/25 1743    Blood culture x two cultures. Draw prior to antibiotics. [[id number]] Collected: 07/16/25 1730    Order Status: Sent Specimen: Blood from Peripheral, Antecubital, Left Updated: 07/16/25 1743            "

## 2025-07-17 NOTE — ASSESSMENT & PLAN NOTE
-admitted D/T RLE abscess/cellulitis with history of surgical I&D 7/15/2025  -at admission hemoglobin stable and afebrile   -ED workup detailed above   -recent blood cultures from 07/14/2025 with no growth to date and wound culture from 07/15/2025 with many Staph aureus   -repeat blood cultures drawn in ED today >>> please follow   -continue vancomycin and ceftriaxone initiated in ED    -tailor/de-escalate as appropriate   -Wound Care consulted   -consider General Surgery consult in AM if indicated   -PRN supportive care as indicated   -elevate RLE when in bed/recliner  -fall precautions  -trend labs, address/replete electrolytes as indicated

## 2025-07-17 NOTE — PLAN OF CARE
Problem: Adult Inpatient Plan of Care  Goal: Plan of Care Review  Outcome: Progressing     Problem: Infection  Goal: Absence of Infection Signs and Symptoms  Outcome: Progressing     Problem: Wound  Goal: Optimal Coping  Outcome: Progressing     Problem: Wound  Goal: Absence of Infection Signs and Symptoms  Outcome: Progressing     Problem: Wound  Goal: Improved Oral Intake  Outcome: Progressing     Problem: Wound  Goal: Optimal Pain Control and Function  Outcome: Progressing     Problem: Wound  Goal: Skin Health and Integrity  Outcome: Progressing     Problem: Wound  Goal: Optimal Wound Healing  Outcome: Progressing

## 2025-07-17 NOTE — PROGRESS NOTES
Pharmacokinetic Assessment Follow Up: IV Vancomycin    Vancomycin serum concentration assessment(s):    The trough level was drawn correctly and can be used to guide therapy at this time. The measurement is within the desired definitive target range of 10 to 20 mcg/mL.    Vancomycin Regimen Plan:    Continue regimen to Vancomycin 1250 mg IV every 8 hours with next serum trough concentration measured at 1300 prior to 1400 dose on 7/18/25.    Drug levels (last 3 results):  Recent Labs   Lab Result Units 07/17/25  1259   Vancomycin Trough ug/ml 10.5       Pharmacy will continue to follow and monitor vancomycin.    Please contact pharmacy at extension [phone] for questions regarding this assessment.    Thank you for the consult,   An Wagner       Patient brief summary:  Mike Yadav is a 39 y.o. male initiated on antimicrobial therapy with IV Vancomycin for treatment of skin & soft tissue infection    The patient's current regimen is vancomycin 1250 mg IV every 8 hours.    Drug Allergies:   Review of patient's allergies indicates:   Allergen Reactions    Amoxicillin Hives       Actual Body Weight:   79.5 kg    Renal Function:   Estimated Creatinine Clearance: 144.5 mL/min (based on SCr of 0.7 mg/dL).,     Dialysis Method (if applicable):  N/A    CBC (last 72 hours):  Recent Labs   Lab Result Units 07/15/25  0352 07/16/25  1632 07/17/25  0353   WBC K/uL 13.08* 10.48 7.26   HGB gm/dL 12.5* 11.2* 12.1*   HCT % 37.5* 34.7* 35.8*   Platelet Count K/uL 296 323 309   Lymph % % 9.3* 11.1* 18.9   Mono % % 8.4 9.1 13.5   Eos % % 2.4 2.8 5.6   Basophil % % 0.5 0.4 0.8       Metabolic Panel (last 72 hours):  Recent Labs   Lab Result Units 07/15/25  0352 07/16/25  1632 07/16/25  1749 07/17/25  0353   Sodium mmol/L 138 138  --  138   Potassium mmol/L 3.8 4.1  --  4.1   Chloride mmol/L 104 103  --  107   CO2 mmol/L 21* 22*  --  22*   Glucose mg/dL 101 83  --  108   Glucose, UA   --   --  Negative  --    BUN mg/dL 10 9  --  7    Creatinine mg/dL 0.7 1.0  --  0.7   Albumin g/dL 3.3* 3.4*  --  2.9*   Bilirubin Total mg/dL 0.8 <0.5  --  0.2   ALP unit/L 88 79  --  77   AST unit/L 14 15  --  14   ALT unit/L 10 10  --  8*   Magnesium  mg/dL 1.8  --   --  2.0   Phosphorus Level mg/dL 3.7  --   --   --        Vancomycin Administrations:  vancomycin given in the last 96 hours                     vancomycin 1,250 mg in 0.9% NaCl 250 mL IVPB (admixture device) (mg) 1,250 mg New Bag 07/17/25 0613     1,250 mg New Bag 07/16/25 2101    vancomycin 1,250 mg in 0.9% NaCl 250 mL IVPB (admixture device) (mg) 1,250 mg New Bag 07/15/25 1309      Restarted  0100     1,250 mg New Bag  0056    vancomycin 2 g in 0.9% sodium chloride 500 mL IVPB (mg) 2,000 mg New Bag 07/14/25 1241                    Microbiologic Results:  Microbiology Results (last 7 days)       Procedure Component Value Units Date/Time    Blood culture x two cultures. Draw prior to antibiotics. [[id number]]  (Normal) Collected: 07/16/25 1710    Order Status: Completed Specimen: Blood from Peripheral, Antecubital, Right Updated: 07/17/25 0700     Blood Culture No Growth After 6 Hours    Blood culture x two cultures. Draw prior to antibiotics. [[id number]]  (Normal) Collected: 07/16/25 1730    Order Status: Completed Specimen: Blood from Peripheral, Antecubital, Left Updated: 07/17/25 0700     Blood Culture No Growth After 6 Hours

## 2025-07-17 NOTE — SUBJECTIVE & OBJECTIVE
No current facility-administered medications on file prior to encounter.     Current Outpatient Medications on File Prior to Encounter   Medication Sig    cefdinir (OMNICEF) 300 MG capsule Take 1 capsule (300 mg total) by mouth 2 (two) times daily. for 14 days    EScitalopram oxalate (LEXAPRO) 10 MG tablet Take 10 mg by mouth once daily.    ibuprofen (ADVIL,MOTRIN) 200 MG tablet Take 3-4 tablets (600-800 mg total) by mouth 3 (three) times daily as needed for Pain.    sulfamethoxazole-trimethoprim 800-160mg (BACTRIM DS) 800-160 mg Tab Take 1 tablet by mouth 2 (two) times daily. for 14 days       Review of patient's allergies indicates:   Allergen Reactions    Amoxicillin Hives       Past Medical History:   Diagnosis Date    Concussion      Past Surgical History:   Procedure Laterality Date    INCISION AND DRAINAGE OF ABSCESS Right 7/15/2025    Procedure: INCISION AND DRAINAGE, ABSCESS;  Surgeon: Rashad Robles MD;  Location: [facility];  Service: General;  Laterality: Right;    LEG SURGERY Bilateral     R femur fx and L tib/fib fx repair    NOSE SURGERY      Fracture repair in 2019     Family History       Problem Relation (Age of Onset)    Multiple sclerosis Paternal Uncle    No Known Problems Mother, Father, Sister          Tobacco Use    Smoking status: Former     Current packs/day: 0.00     Types: Cigarettes     Quit date: 2019     Years since quittin.1    Smokeless tobacco: Never   Substance and Sexual Activity    Alcohol use: Yes     Comment: 3-5 drinks per day    Drug use: Yes     Types: Marijuana    Sexual activity: Not on file     Review of Systems   Musculoskeletal:         Pain and swelling RLE     Objective:     Vital Signs (Most Recent):  Temp: 98.1 °F (36.7 °C) (25)  Pulse: (!) 53 (25)  Resp: 18 (25)  BP: (!) 129/56 (25)  SpO2: 98 % (25) Vital Signs (24h Range):  Temp:  [98 °F (36.7 °C)-99.7 °F (37.6 °C)] 98.1 °F (36.7 °C)  Pulse:   [50-77] 53  Resp:  [18-20] 18  SpO2:  [95 %-98 %] 98 %  BP: (111-143)/(56-72) 129/56     Weight: 79.5 kg (175 lb 4.8 oz)  Body mass index is 25.89 kg/m².     Physical Exam  Vitals reviewed.   Constitutional:       General: He is not in acute distress.  HENT:      Head: Normocephalic and atraumatic.      Nose: Nose normal.   Eyes:      General:         Right eye: No discharge.         Left eye: No discharge.   Cardiovascular:      Rate and Rhythm: Normal rate and regular rhythm.   Pulmonary:      Effort: Pulmonary effort is normal. No respiratory distress.      Breath sounds: No stridor.   Musculoskeletal:         General: Normal range of motion.      Cervical back: Normal range of motion.   Skin:     General: Skin is warm and dry.      Capillary Refill: Capillary refill takes 2 to 3 seconds.      Comments: RLE some erythema and swelling. Packing and penrose removed. No evidence of residual collection and no pus.     Functional RLE    Mild tenderness    Neurological:      General: No focal deficit present.      Mental Status: He is alert and oriented to person, place, and time.   Psychiatric:         Mood and Affect: Mood normal.         Behavior: Behavior normal.            I have reviewed all pertinent lab results within the past 24 hours.    Significant Diagnostics:  I have reviewed all pertinent imaging results/findings within the past 24 hours.

## 2025-07-18 VITALS
HEART RATE: 49 BPM | BODY MASS INDEX: 25.97 KG/M2 | OXYGEN SATURATION: 96 % | DIASTOLIC BLOOD PRESSURE: 76 MMHG | RESPIRATION RATE: 18 BRPM | WEIGHT: 175.31 LBS | HEIGHT: 69 IN | TEMPERATURE: 98 F | SYSTOLIC BLOOD PRESSURE: 122 MMHG

## 2025-07-18 LAB — BACTERIA SPEC AEROBE CULT: ABNORMAL

## 2025-07-18 PROCEDURE — 94761 N-INVAS EAR/PLS OXIMETRY MLT: CPT

## 2025-07-18 PROCEDURE — 25000003 PHARM REV CODE 250: Performed by: PHYSICIAN ASSISTANT

## 2025-07-18 PROCEDURE — 99024 POSTOP FOLLOW-UP VISIT: CPT | Mod: GT,,, | Performed by: SURGERY

## 2025-07-18 PROCEDURE — 63600175 PHARM REV CODE 636 W HCPCS: Performed by: PHYSICIAN ASSISTANT

## 2025-07-18 RX ADMIN — VANCOMYCIN HYDROCHLORIDE 1250 MG: 1.25 INJECTION, POWDER, LYOPHILIZED, FOR SOLUTION INTRAVENOUS at 06:07

## 2025-07-18 NOTE — PROGRESS NOTES
[facility] - Med Surg ([address])  Wound Care    Patient Name:  Mike Yadav   MRN:  [id number]  Date: 2025  Diagnosis: Cellulitis and abscess of right leg    History:     Past Medical History:   Diagnosis Date    2019       Social History[1]    Precautions:     Allergies as of 2025 - Reviewed 2025   Allergen Reaction Noted    Amoxicillin Hives 2025       WOC Assessment Details/Treatment     Wound care follow up:     Met with patient this morning. Patient completed wound care independently. Supplies given to patient to take upon discharge. Nursing and MD team notified. Wound care available to assist with pt prn.     2025         [1]   Social History  Socioeconomic History    Marital status: Single   Tobacco Use    Smoking status: Former     Current packs/day: 0.00     Types: Cigarettes     Quit date: 2019     Years since quittin.1    Smokeless tobacco: Never   Substance and Sexual Activity    Alcohol use: Yes     Comment: 3-5 drinks per day    Drug use: Yes     Types: Marijuana     Social Drivers of Health     Financial Resource Strain: Medium Risk (2025)    Overall Financial Resource Strain (CARDIA)     Difficulty of Paying Living Expenses: Somewhat hard   Food Insecurity: No Food Insecurity (2025)    Hunger Vital Sign     Worried About Running Out of Food in the Last Year: Never true     Ran Out of Food in the Last Year: Never true   Transportation Needs: No Transportation Needs (2025)    PRAPARE - Transportation     Lack of Transportation (Medical): No     Lack of Transportation (Non-Medical): No   Physical Activity: Sufficiently Active (7/15/2025)    Exercise Vital Sign     Days of Exercise per Week: 6 days     Minutes of Exercise per Session: 150+ min   Stress: No Stress Concern Present (2025)    [employer] [place] [employer] - Occupational Stress Questionnaire     Feeling of Stress : Not at all   Housing Stability: Low Risk   (7/16/2025)    Housing Stability Vital Sign     Unable to Pay for Housing in the Last Year: No     Homeless in the Last Year: No      None

## 2025-07-18 NOTE — NURSING
Assumed care of Mr. Yadav. Report received from YOUNG Bowles. I agree with the current assessment Jelena has documented in flowsheets.

## 2025-07-18 NOTE — PLAN OF CARE
Case Management Final Discharge Note    Discharge Disposition: Home    New DME ordered / company name: None    Relevant SDOH / Transition of Care Barriers:  None    Person available to provide assistance at home when needed and their contact information: Self    Scheduled followup appointment: PCP, Post op appt    Referrals placed: None    Transportation: Will drive self home        Patient  educated on discharge services and updated on DC plan. Bedside RN notified, patient clear to discharge from Case Management Perspective.      07/18/25 0942   Final Note   Assessment Type Final Discharge Note   Anticipated Discharge Disposition Home   Hospital Resources/Appts/Education Provided Provided patient/caregiver with written discharge plan information;Appointments scheduled and added to AVS   Post-Acute Status   Discharge Delays None known at this time     [facility] ([address])  Discharge Final Note    Primary Care Provider: Jayesh Parks NP    Expected Discharge Date: 7/18/2025    Final Discharge Note (most recent)       Final Note - 07/18/25 0942          Final Note    Assessment Type Final Discharge Note (P)      Anticipated Discharge Disposition Home or Self Care (P)      Hospital Resources/Appts/Education Provided Provided patient/caregiver with written discharge plan information;Appointments scheduled and added to AVS (P)         Post-Acute Status    Discharge Delays None known at this time (P)                      Important Message from Medicare             Contact Info       Jayesh Parks NP   Specialty: Family Medicine   Relationship: PCP - General    [address]  [address]  [facility] [address]   Phone: [phone]       Next Steps: Follow up in 2 week(s)    Instructions: post-hospital follow-up    Rashad Robles MD   Specialty: General Surgery, Bariatrics    [address]  [address]  [facility] [address]   Phone: [phone]       Next Steps: Follow up in  2 week(s)    Instructions: post-hospital follow-up

## 2025-07-18 NOTE — DISCHARGE SUMMARY
"[address] Medicine  Discharge Summary      Patient Name: Mike Yadav  MRN: [id number]  RANI: [id number]  Patient Class: IP- Inpatient  Admission Date: 7/16/2025  Hospital Length of Stay: 2 days  Discharge Date and Time: 7/18/2025 11:10 AM  Attending Physician: No att. providers found   Discharging Provider: NGUYỄN Pradhan MD  Primary Care Provider: Jayesh Parks NP    Primary Care Team: Networked reference to record PCT     HPI:   Mr. 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 He treated at home initially with wound closure then the are reopened and wound returned at which time he was seen at a pharmacy clinic around 6/26/25 and received IM injection and PO doxy prescription and reported wound healed then unfortunately reopened prompting ED evaluation and admission 7/14-15/2025 at [facility], see below for recent admission details.     He was admitted 7/14-15/2025 due to "RLE cellulitis and started on ceftriaxone, vancomycin. Surgery consulted and underwent I&D 07/15. Preliminary blood cultures negative and intra-operative cultures collected. Patient reported he was unable to remain in hospital for the following day; discussed treatment options and ultimately plan for 14 day course of cefdinir and TMP-SMX. Referred for general surgery and internal medicine follow-up after discharge". CT of right leg during hospital stay noted "no evidence of osteomyelitis. Fluid collection with rim enhancement and small gas bubbles anterior to the distal tibia concerning for an abscess collection. Generalized edema and fluid tracking within the soft tissues of the right leg."    He presents to ED tonight due to increased pain and swelling to RLE with drainage saturating dressing despite taking home PO ABXs. He denies any further injury " since leaving hospital yesterday. He denies fever, chills, diaphoresis, SOB, MCELROY, CP, abdominal pain, N/V/D, constipation, urinary symptoms or hematuria, blood in stool, decreased appetite, changes in PO intake, light headiness, dizziness, or headache. He lives with family, is independent ADLs, denies ambulatory aid use.  He reports daily marijuana use, daily alcohol intake; denies tobacco use, denies illicit drug use and > 7 months.    In the ED he is HDS and afebrile.  CBC with WBC 10., H/H 11.2/35, platelets 323.  Chemistry , K 4.1, chloride 103, CO2 22, BUN 9, SCr 1.0, glucose 83.  LFTs unremarkable.  POCT lactic 1.19.  .  UA noninfectious.  Blood cultures in process.    Right foot x-ray :  No acute fracture or dislocation of the foot.   Radiopaque density within the soft tissues of the lower leg seen on the oblique view.  Please correlate with any recent procedure or packing material within the soft tissues.     The patient was admitted to the Hospital Medicine Service for further evaluation and management.     * No surgery found *      Hospital Course:   Readmitted with increased RLE swelling. Resumed IV antibiotic therapy and surgery, wound care consulted. RLE elevated. Symptomatically improved and culture results showed pan-sensitive staph aureus. Will de-escalate to cefdinir alone. With clinical improvement and vital stability, he was prepared for discharge home and will follow-up in clinic to establish PCP and with surgery for wound.     Goals of Care Treatment Preferences:  Code Status: Full Code         Consults:   Consults (From admission, onward)          Status Ordering Provider     Inpatient consult to General Surgery  Once        Provider:  Edward Campo MD    Completed STEPHEN MTZ            Assessment & Plan  Cellulitis and abscess of right leg  - Continue ceftriaxone, vancomycin; wound care, surgery consults. Elevate extremity. Anticipate drain removal and if culture results  finalize can likely discharge with PO options.    Final Active Diagnoses:    Diagnosis Date Noted POA    PRINCIPAL PROBLEM:  Cellulitis and abscess of right leg [L03.115, L02.415] 07/14/2025 Yes    Daily consumption of alcohol [Z78.9] 07/16/2025 Yes      Problems Resolved During this Admission:       Discharged Condition: good    Disposition: Home or Self Care    Follow Up:   Follow-up Information       Jayesh Parks NP Follow up in 2 week(s).    Specialty: Family Medicine  Why: post-hospital follow-up  Contact information:  [address]  [address]  [facility] [id number] [phone]               Rashad Robles MD Follow up in 2 week(s).    Specialties: General Surgery, Bariatrics  Why: post-hospital follow-up  Contact information:  [address]  [address]  [facility] [address] [phone]                           Patient Instructions:      Activity as tolerated       Significant Diagnostic Studies:   CBC:  Recent Labs   Lab 07/15/25  0352 07/16/25  1632 07/17/25  0353   WBC 13.08* 10.48 7.26   HGB 12.5* 11.2* 12.1*   HCT 37.5* 34.7* 35.8*    323 309   NEUTROAUTO 79.0* 76.2* 60.8   LYMPH 1.21  9.3* 1.16  11.1* 1.37  18.9   MONO 8.4  1.10* 9.1  0.95 13.5  0.98   EOS 2.4  0.32 2.8  0.29 5.6  0.41     BMP:  Recent Labs   Lab 07/15/25  0352 07/16/25  1632 07/17/25  0353    138 138   K 3.8 4.1 4.1    103 107   CO2 21* 22* 22*   BUN 10 9 7   CREATININE 0.7 1.0 0.7    83 108   CALCIUM 8.8 8.8 8.6*   MG 1.8  --  2.0   PHOS 3.7  --   --      CMP:  Recent Labs   Lab 07/15/25  0352 07/16/25  1632 07/17/25 0353    138 138   K 3.8 4.1 4.1    103 107   CO2 21* 22* 22*   BUN 10 9 7   CREATININE 0.7 1.0 0.7    83 108   CALCIUM 8.8 8.8 8.6*   MG 1.8  --  2.0   PHOS 3.7  --   --    ALKPHOS 88 79 77   AST 14 15 14   ALT 10 10 8*   BILITOT 0.8 <0.5 0.2   PROT 7.1 7.2 6.8   ALBUMIN 3.3* 3.4* 2.9*   ANIONGAP 13 13 9     Imaging Results               X-Ray Foot Complete Right (Final result)  Result time 07/16/25 16:50:07      Final result by Olvin Perry MD (07/16/25 16:50:07)                   Impression:      No acute fracture or dislocation of the foot.    Radiopaque density within the soft tissues of the lower leg seen on the oblique view.  Please correlate with any recent procedure or packing material within the soft tissues.      Electronically signed by: Olvin Perry MD  Date:    07/16/2025  Time:    16:50               Narrative:    EXAMINATION:  XR FOOT COMPLETE 3 VIEW RIGHT    CLINICAL HISTORY:  . Edema, unspecified    TECHNIQUE:  AP, lateral, and oblique views of the right foot were performed.    COMPARISON:  None    FINDINGS:  No evidence of an acute fracture or dislocation.  No osseous erosion.  There is a radiopaque density within the soft tissues of the lower leg seen only on the oblique view.                                      Pending Diagnostic Studies:       None           Medications:  Reconciled Home Medications:      Medication List        CONTINUE taking these medications      cefdinir 300 MG capsule  Commonly known as: OMNICEF  Take 1 capsule (300 mg total) by mouth 2 (two) times daily. for 14 days     EScitalopram oxalate 10 MG tablet  Commonly known as: LEXAPRO  Take 10 mg by mouth once daily.     ibuprofen 200 MG tablet  Commonly known as: ADVIL,MOTRIN  Take 3-4 tablets (600-800 mg total) by mouth 3 (three) times daily as needed for Pain.            STOP taking these medications      sulfamethoxazole-trimethoprim 800-160mg 800-160 mg Tab  Commonly known as: BACTRIM DS              Indwelling Lines/Drains at time of discharge:   Lines/Drains/Airways       None                       Time spent on the discharge of patient: 35 minutes         D Brayden Pradhan MD  Department of Hospital Medicine  [facility] ([address])

## 2025-07-18 NOTE — ASSESSMENT & PLAN NOTE
39M s/p I/D of RLE on 7/15 here with pain and swelling. No fevers or white count.     -Wound care seen. Packing in place. Would have him change it today so he can feel comfortable doing it at home  -Change every 1-2 days  - Okay to shower and wash wound/ pat dry before packing changes   - elevate leg for swelling  - Will need oral antibiotic regimen for home per hospital medicine   - Okay to discharge later today from a surgery standpoint   - he can follow-up out pt   -Please call with questions

## 2025-07-18 NOTE — PLAN OF CARE
Problem: Adult Inpatient Plan of Care  Goal: Plan of Care Review  Outcome: Progressing     Problem: Adult Inpatient Plan of Care  Goal: Optimal Comfort and Wellbeing  Outcome: Progressing     Problem: Wound  Goal: Optimal Coping  Outcome: Progressing     Problem: Wound  Goal: Absence of Infection Signs and Symptoms  Outcome: Progressing     Problem: Wound  Goal: Optimal Pain Control and Function  Outcome: Progressing     Problem: Wound  Goal: Skin Health and Integrity  Outcome: Progressing     Problem: Wound  Goal: Optimal Wound Healing  Outcome: Progressing

## 2025-07-18 NOTE — NURSING
In agreement and eager for DC.  VU of DC instructions, paperwork and prescriptions passed. IV removed with cath tip intact, WNL.  To be DC home with family.  Pt dressed and wants to walk on his own. Free from falls or skin breakdown this hospital admission.

## 2025-07-18 NOTE — SUBJECTIVE & OBJECTIVE
Interval History: No issues overnight. Swelling much improved. Packing in place. Pain controlled.     Medications:  Continuous Infusions:  Scheduled Meds:   cefTRIAXone (Rocephin) IV (PEDS and ADULTS)  1 g Intravenous Q24H    enoxparin  40 mg Subcutaneous Daily    senna-docusate  1 tablet Oral BID    vancomycin (VANCOCIN) IV (PEDS and ADULTS)  15 mg/kg Intravenous Q8H     PRN Meds:  Current Facility-Administered Medications:     acetaminophen, 650 mg, Oral, Q4H PRN    HYDROcodone-acetaminophen, 1 tablet, Oral, Q4H PRN    HYDROcodone-acetaminophen, 1 tablet, Oral, Q4H PRN    melatonin, 6 mg, Oral, Nightly PRN    ondansetron, 8 mg, Oral, Q6H PRN    ondansetron, 8 mg, Intravenous, Q6H PRN    polyethylene glycol, 17 g, Oral, BID PRN    sodium chloride 0.9%, 10 mL, Intravenous, PRN    Pharmacy to dose Vancomycin consult, , , Once **AND** vancomycin - pharmacy to dose, , Intravenous, pharmacy to manage frequency     Review of patient's allergies indicates:   Allergen Reactions    Amoxicillin Hives     Objective:     Vital Signs (Most Recent):  Temp: 97.7 °F (36.5 °C) (07/18/25 0413)  Pulse: (!) 44 (07/18/25 0413)  Resp: 17 (07/18/25 0413)  BP: 118/67 (07/18/25 0413)  SpO2: 95 % (07/18/25 0413) Vital Signs (24h Range):  Temp:  [97.5 °F (36.4 °C)-98.2 °F (36.8 °C)] 97.7 °F (36.5 °C)  Pulse:  [42-54] 44  Resp:  [16-20] 17  SpO2:  [95 %-100 %] 95 %  BP: (114-137)/(56-82) 118/67     Weight: 79.5 kg (175 lb 4.8 oz)  Body mass index is 25.89 kg/m².    Intake/Output - Last 3 Shifts         07/16 0700  07/17 0659 07/17 0700 07/18 0659 07/18 0700 07/19 0659    P.O. 280 440     I.V. (mL/kg) 0 (0) 0 (0)     Other 0 0     Total Intake(mL/kg) 280 (3.5) 440 (5.5)     Urine (mL/kg/hr) 1 0 (0)     Stool 0 0     Total Output 1 0     Net +279 +440            Unmeasured Urine Occurrence 2 x 8 x     Unmeasured Stool Occurrence 0 x 0 x              Physical Exam  Vitals reviewed.   Constitutional:       General: He is not in acute  distress.  HENT:      Head: Normocephalic and atraumatic.      Nose: Nose normal.   Eyes:      General:         Right eye: No discharge.         Left eye: No discharge.   Cardiovascular:      Rate and Rhythm: Normal rate and regular rhythm.   Pulmonary:      Effort: Pulmonary effort is normal. No respiratory distress.      Breath sounds: No stridor.   Musculoskeletal:         General: Normal range of motion.      Cervical back: Normal range of motion.   Skin:     General: Skin is warm and dry.      Capillary Refill: Capillary refill takes 2 to 3 seconds.      Comments: RLE improved edam and erythema  Packing in place     Functional RLE    Mild tenderness    Neurological:      General: No focal deficit present.      Mental Status: He is alert and oriented to person, place, and time.   Psychiatric:         Mood and Affect: Mood normal.         Behavior: Behavior normal.          Significant Labs:  I have reviewed all pertinent lab results within the past 24 hours.    Significant Diagnostics:  I have reviewed all pertinent imaging results/findings within the past 24 hours.

## 2025-07-18 NOTE — PROGRESS NOTES
[facility] ([address])  General Surgery  Progress Note    Subjective:     History of Present Illness:  39M with RLE abscess operatively drained with Dr. Robles on 7/15. Packing a penrose drain was left in place. He presents with pain and swelling. He still has some erythema. He has normal white count. No fevers. He is on oral antibiotics at home.  Surgery consulted.     Post-Op Info:  * No surgery found *         Interval History: No issues overnight. Swelling much improved. Packing in place. Pain controlled.     Medications:  Continuous Infusions:  Scheduled Meds:   cefTRIAXone (Rocephin) IV (PEDS and ADULTS)  1 g Intravenous Q24H    enoxparin  40 mg Subcutaneous Daily    senna-docusate  1 tablet Oral BID    vancomycin (VANCOCIN) IV (PEDS and ADULTS)  15 mg/kg Intravenous Q8H     PRN Meds:  Current Facility-Administered Medications:     acetaminophen, 650 mg, Oral, Q4H PRN    HYDROcodone-acetaminophen, 1 tablet, Oral, Q4H PRN    HYDROcodone-acetaminophen, 1 tablet, Oral, Q4H PRN    melatonin, 6 mg, Oral, Nightly PRN    ondansetron, 8 mg, Oral, Q6H PRN    ondansetron, 8 mg, Intravenous, Q6H PRN    polyethylene glycol, 17 g, Oral, BID PRN    sodium chloride 0.9%, 10 mL, Intravenous, PRN    Pharmacy to dose Vancomycin consult, , , Once **AND** vancomycin - pharmacy to dose, , Intravenous, pharmacy to manage frequency     Review of patient's allergies indicates:   Allergen Reactions    Amoxicillin Hives     Objective:     Vital Signs (Most Recent):  Temp: 97.7 °F (36.5 °C) (07/18/25 0413)  Pulse: (!) 44 (07/18/25 0413)  Resp: 17 (07/18/25 0413)  BP: 118/67 (07/18/25 0413)  SpO2: 95 % (07/18/25 0413) Vital Signs (24h Range):  Temp:  [97.5 °F (36.4 °C)-98.2 °F (36.8 °C)] 97.7 °F (36.5 °C)  Pulse:  [42-54] 44  Resp:  [16-20] 17  SpO2:  [95 %-100 %] 95 %  BP: (114-137)/(56-82) 118/67     Weight: 79.5 kg (175 lb 4.8 oz)  Body mass index is 25.89 kg/m².    Intake/Output - Last 3 Shifts         07/16  0700  07/17 0659 07/17 0700  07/18 0659 07/18 0700  07/19 0659    P.O. 280 440     I.V. (mL/kg) 0 (0) 0 (0)     Other 0 0     Total Intake(mL/kg) 280 (3.5) 440 (5.5)     Urine (mL/kg/hr) 1 0 (0)     Stool 0 0     Total Output 1 0     Net +279 +440            Unmeasured Urine Occurrence 2 x 8 x     Unmeasured Stool Occurrence 0 x 0 x              Physical Exam  Vitals reviewed.   Constitutional:       General: He is not in acute distress.  HENT:      Head: Normocephalic and atraumatic.      Nose: Nose normal.   Eyes:      General:         Right eye: No discharge.         Left eye: No discharge.   Cardiovascular:      Rate and Rhythm: Normal rate and regular rhythm.   Pulmonary:      Effort: Pulmonary effort is normal. No respiratory distress.      Breath sounds: No stridor.   Musculoskeletal:         General: Normal range of motion.      Cervical back: Normal range of motion.   Skin:     General: Skin is warm and dry.      Capillary Refill: Capillary refill takes 2 to 3 seconds.      Comments: RLE improved edam and erythema  Packing in place     Functional RLE    Mild tenderness    Neurological:      General: No focal deficit present.      Mental Status: He is alert and oriented to person, place, and time.   Psychiatric:         Mood and Affect: Mood normal.         Behavior: Behavior normal.          Significant Labs:  I have reviewed all pertinent lab results within the past 24 hours.    Significant Diagnostics:  I have reviewed all pertinent imaging results/findings within the past 24 hours.  Assessment/Plan:     * Cellulitis and abscess of right leg  39M s/p I/D of RLE on 7/15 here with pain and swelling. No fevers or white count.     -Wound care seen. Packing in place. Would have him change it today so he can feel comfortable doing it at home  -Change every 1-2 days  - Okay to shower and wash wound/ pat dry before packing changes   - elevate leg for swelling  - Will need oral antibiotic regimen for home per  hospital medicine   - Okay to discharge later today from a surgery standpoint   - he can follow-up out pt   -Please call with questions            Bonifacio Ibrahim MD  General Surgery  [facility] ([address])

## 2025-07-18 NOTE — SUBJECTIVE & OBJECTIVE
Interval History: No acute events overnight. Preliminary cultures from wound with staph. Discussed plan of care. No other concerns at this time.    Review of Systems   Constitutional:  Negative for chills and fever.   Respiratory:  Negative for cough and shortness of breath.    Cardiovascular:  Negative for chest pain and palpitations.   Gastrointestinal:  Negative for abdominal pain, nausea and vomiting.     Objective:     Vital Signs (Most Recent):  Temp: 97.5 °F (36.4 °C) (07/17/25 1947)  Pulse: (!) 52 (07/17/25 1947)  Resp: 16 (07/17/25 1947)  BP: 137/67 (07/17/25 1947)  SpO2: 99 % (07/17/25 1947) Vital Signs (24h Range):  Temp:  [97.5 °F (36.4 °C)-98.6 °F (37 °C)] 97.5 °F (36.4 °C)  Pulse:  [42-53] 52  Resp:  [16-20] 16  SpO2:  [95 %-100 %] 99 %  BP: (111-137)/(56-70) 137/67     Weight: 79.5 kg (175 lb 4.8 oz)  Body mass index is 25.89 kg/m².    Intake/Output Summary (Last 24 hours) at 7/17/2025 2147  Last data filed at 7/17/2025 1524  Gross per 24 hour   Intake 400 ml   Output 1 ml   Net 399 ml         Physical Exam  Vitals and nursing note reviewed.   Constitutional:       General: He is not in acute distress.     Appearance: He is well-developed.   HENT:      Head: Normocephalic and atraumatic.   Eyes:      General:         Right eye: No discharge.         Left eye: No discharge.      Conjunctiva/sclera: Conjunctivae normal.   Cardiovascular:      Rate and Rhythm: Normal rate.      Pulses: Normal pulses.   Pulmonary:      Effort: Pulmonary effort is normal. No respiratory distress.   Abdominal:      Palpations: Abdomen is soft.      Tenderness: There is no abdominal tenderness.   Musculoskeletal:         General: Normal range of motion.      Right lower leg: Edema present.      Left lower leg: No edema.      Comments: Improved edema from prior. Dressing in place to RLE.   Skin:     General: Skin is warm and dry.   Neurological:      Mental Status: He is alert and oriented to person, place, and time.            Significant Labs:   CBC:  Recent Labs   Lab 07/15/25  0352 07/16/25  1632 07/17/25  0353   WBC 13.08* 10.48 7.26   HGB 12.5* 11.2* 12.1*   HCT 37.5* 34.7* 35.8*    323 309   NEUTROAUTO 79.0* 76.2* 60.8   LYMPH 1.21  9.3* 1.16  11.1* 1.37  18.9   MONO 8.4  1.10* 9.1  0.95 13.5  0.98   EOS 2.4  0.32 2.8  0.29 5.6  0.41   BMP:  Recent Labs   Lab 07/15/25  0352 07/16/25  1632 07/17/25  0353    138 138   K 3.8 4.1 4.1    103 107   CO2 21* 22* 22*   BUN 10 9 7   CREATININE 0.7 1.0 0.7    83 108   CALCIUM 8.8 8.8 8.6*   MG 1.8  --  2.0   PHOS 3.7  --   --        Significant Imaging: I have reviewed and interpreted all pertinent imaging results/findings within the past 24 hours.

## 2025-07-19 LAB
BACTERIA BLD CULT: NORMAL
BACTERIA BLD CULT: NORMAL

## 2025-07-19 NOTE — HOSPITAL COURSE
Readmitted with increased RLE swelling. Resumed IV antibiotic therapy and surgery, wound care consulted. RLE elevated. Symptomatically improved and culture results showed pan-sensitive staph aureus. Will de-escalate to cefdinir alone. With clinical improvement and vital stability, he was prepared for discharge home and will follow-up in clinic to establish PCP and with surgery for wound.

## 2025-07-22 LAB
BACTERIA BLD CULT: NORMAL
BACTERIA BLD CULT: NORMAL

## 2025-07-25 ENCOUNTER — OFFICE VISIT (OUTPATIENT)
Dept: INTERNAL MEDICINE | Facility: CLINIC | Age: 40
End: 2025-07-25
Payer: COMMERCIAL

## 2025-07-25 VITALS
HEIGHT: 69 IN | HEART RATE: 60 BPM | BODY MASS INDEX: 27.92 KG/M2 | SYSTOLIC BLOOD PRESSURE: 118 MMHG | WEIGHT: 188.5 LBS | DIASTOLIC BLOOD PRESSURE: 74 MMHG | OXYGEN SATURATION: 96 %

## 2025-07-25 DIAGNOSIS — L02.415 CELLULITIS AND ABSCESS OF RIGHT LEG: ICD-10-CM

## 2025-07-25 DIAGNOSIS — L03.115 CELLULITIS AND ABSCESS OF RIGHT LEG: ICD-10-CM

## 2025-07-25 PROCEDURE — 99999 PR PBB SHADOW E&M-EST. PATIENT-LVL III: CPT | Mod: PBBFAC,,,

## 2025-07-25 RX ORDER — MUPIROCIN 20 MG/G
OINTMENT TOPICAL 2 TIMES DAILY
Qty: 15 G | Refills: 0 | Status: SHIPPED | OUTPATIENT
Start: 2025-07-25

## 2025-07-25 NOTE — PROGRESS NOTES
CHIEF COMPLAINT     No chief complaint on file.      HPI     Mike Yadav is a 39 y.o. male who presents for xxx today.    PCP is Edward Quiros MD, patient is new to me. This note was generated with the assistance of ambient listening technology. Verbal consent was obtained by the patient and accompanying visitor(s) for the recording of patient appointment to facilitate this note. I attest to having reviewed and edited the generated note for accuracy, though some syntax or spelling errors may persist. Please contact the author of this note for any clarification.     History of Present Illness    CHIEF COMPLAINT:  Patient presents today for hospital follow-up    POST-SURGICAL WOUND CARE:  He reports ongoing post-surgical wound care with daily bandage changes and wound packing. He describes a new hole that developed beneath the initial surgical site, which he attributes to weak skin and an underlying cavity. He has been changing wound dressing daily, though recently missed one day. He notes minimal drainage after 24 hours, primarily consisting of blood or clear fluid, with no thick or purulent discharge. He is currently using hospital-provided ribbon-like packing material, initially using 8-9 inches of packing, now down to approximately 6 inches. He notes the wound appears to be getting shorter and more challenging to pack. He believes the wound is healing well, though a new drainage area has recently developed.    MEDICATIONS:  He is currently taking antibiotics with one week of treatment remaining. He notes the medication appears to be effectively treating the condition, with healing progressing well. He reports no adverse effects from the antibiotic treatment.    SOCIAL HISTORY:  He works as a  with current significant physical activity limitations. He is unable to perform kicking or intense martial arts movements due to his current medical condition. Despite limitations, he  continues to engage in modified work activities as he still must move and use his body for work purposes.      ROS:  General: -fever, -chills, -fatigue, -weight gain, -weight loss  Eyes: -vision changes, -redness, -discharge  ENT: -ear pain, -nasal congestion, -sore throat  Cardiovascular: -chest pain, -palpitations, -lower extremity edema  Respiratory: -cough, -shortness of breath  Gastrointestinal: -abdominal pain, -nausea, -vomiting, -diarrhea, -constipation, -blood in stool  Genitourinary: -dysuria, -hematuria, -frequency  Musculoskeletal: -joint pain, -muscle pain  Skin: -rash, -lesion, +wound, +urethral discharge  Neurological: -headache, -dizziness, -numbness, -tingling  Psychiatric: -anxiety, -depression, -sleep difficulty          Past Medical History:  Past Medical History:   Diagnosis Date    Concussion 2019       Home Medications:  Prior to Admission medications    Medication Sig Start Date End Date Taking? Authorizing Provider   cefdinir (OMNICEF) 300 MG capsule Take 1 capsule (300 mg total) by mouth 2 (two) times daily. for 14 days 7/15/25 7/29/25 Yes STEPHEN Pradhan MD   EScitalopram oxalate (LEXAPRO) 10 MG tablet Take 10 mg by mouth once daily.   Yes Provider, Historical   ibuprofen (ADVIL,MOTRIN) 200 MG tablet Take 3-4 tablets (600-800 mg total) by mouth 3 (three) times daily as needed for Pain.  Patient not taking: Reported on 7/25/2025 7/15/25 7/25/25  STEPHEN Pradhan MD   mupirocin (BACTROBAN) 2 % ointment Apply topically 2 (two) times daily. 7/25/25   Jayesh Parks NP       Review of Systems:  Review of Systems   Constitutional: Negative.    Respiratory: Negative.     Cardiovascular: Negative.    Skin:  Positive for wound.       Health Maintainence:   Immunizations:  Health Maintenance         Date Due Completion Date    Hepatitis C Screening Never done ---    Lipid Panel Never done ---    HIV Screening Never done ---    Hemoglobin A1c (Diabetic Prevention Screening) Never done ---  "   COVID-19 Vaccine (3 - 2024-25 season) 09/01/2024 11/30/2021    Influenza Vaccine (1) 09/01/2025 ---    TETANUS VACCINE 04/28/2034 4/28/2024    RSV Vaccine (Age 60+ and Pregnant patients) (1 - 1-dose 75+ series) 12/15/2062 ---             PHYSICAL EXAM     /74 (BP Location: Left arm, Patient Position: Sitting)   Pulse 60   Ht 5' 9" (1.753 m)   Wt 85.5 kg (188 lb 7.9 oz)   SpO2 96%   BMI 27.84 kg/m²     Physical Exam  Vitals reviewed.   Constitutional:       Appearance: He is well-developed.   HENT:      Head: Normocephalic and atraumatic.   Eyes:      Conjunctiva/sclera: Conjunctivae normal.   Cardiovascular:      Rate and Rhythm: Normal rate.   Pulmonary:      Effort: Pulmonary effort is normal. No respiratory distress.   Skin:     General: Skin is warm and dry.      Findings: Abscess present. No rash.          Neurological:      Mental Status: He is alert and oriented to person, place, and time.      Coordination: Coordination normal.   Psychiatric:         Behavior: Behavior normal.           ASSESSMENT/PLAN   Assessment & Plan    PLAN SUMMARY:  - Continue current antibiotic regimen for approximately 1 more week  - Apply Mupirocin ointment to lower wound spot once or twice daily with dressing changes  - Continue daily wound dressing changes and document progress with photos  - Referral to Dr. Maldonado for primary care  - Follow-up appointment scheduled in 1 week to reassess wound healing progress    ## INFECTION FOLLOWING A PROCEDURE (DEEP INCISIONAL SURGICAL SITE):  - Assessed post-op wound healing progress.  - Noted drainage from the surgical site, mostly blood or fluid, with some pus present from a new opening below initial surgical site.  - Evaluated infection management, determining antibiotics are effective but more time needed for complete healing.  - The area around the packed site looks good with mostly inflammation present.  - Instructed the patient to continue changing wound dressing daily " and document wound healing progress by taking photos during each dressing change.  - Advised the patient to monitor for signs of worsening, including increased redness or drainage, especially from lower part of wound.  - Prescribed Mupirocin ointment to be applied to lower wound spot once or twice daily with dressing changes.  - Continued current antibiotic regimen for approximately 1 more week.    ## PERSISTENT POSTPROCEDURAL FISTULA:  - Noted a new hole at the surgical site with pus drainage, possibly related to physical activity.  - Current antibiotic therapy is being continued to address the fistula.    ## FOLLOW-UP AND REFERRAL:  - Scheduled follow-up visit in 1 week to reassess wound healing progress.  - Instructed the patient to contact office through patient portal if redness increases, drainage worsens (especially from lower part of wound), or any other concerning changes occur before scheduled follow-up.  - Referred to Dr. Maldonado for primary care.          Diagnoses and all orders for this visit:    Cellulitis and abscess of right leg  -     Ambulatory referral/consult to Internal Medicine  -     mupirocin (BACTROBAN) 2 % ointment; Apply topically 2 (two) times daily.          Jayesh Parks NP   Department of Internal Medicine - [facility]  2:25 PM

## 2025-07-28 ENCOUNTER — OFFICE VISIT (OUTPATIENT)
Dept: SURGERY | Facility: CLINIC | Age: 40
End: 2025-07-28
Payer: COMMERCIAL

## 2025-07-28 VITALS
WEIGHT: 186.63 LBS | DIASTOLIC BLOOD PRESSURE: 72 MMHG | SYSTOLIC BLOOD PRESSURE: 122 MMHG | BODY MASS INDEX: 27.56 KG/M2 | HEART RATE: 60 BPM

## 2025-07-28 DIAGNOSIS — L02.415 CELLULITIS AND ABSCESS OF RIGHT LEG: Primary | ICD-10-CM

## 2025-07-28 DIAGNOSIS — L03.115 CELLULITIS AND ABSCESS OF RIGHT LEG: Primary | ICD-10-CM

## 2025-07-28 PROCEDURE — 99999 PR PBB SHADOW E&M-EST. PATIENT-LVL IV: CPT | Mod: PBBFAC,,, | Performed by: SURGERY

## 2025-07-30 ENCOUNTER — OFFICE VISIT (OUTPATIENT)
Dept: INTERNAL MEDICINE | Facility: CLINIC | Age: 40
End: 2025-07-30
Payer: COMMERCIAL

## 2025-07-30 VITALS
WEIGHT: 187.81 LBS | SYSTOLIC BLOOD PRESSURE: 116 MMHG | HEART RATE: 46 BPM | DIASTOLIC BLOOD PRESSURE: 70 MMHG | HEIGHT: 69 IN | OXYGEN SATURATION: 98 % | BODY MASS INDEX: 27.82 KG/M2

## 2025-07-30 DIAGNOSIS — L02.415 CELLULITIS AND ABSCESS OF RIGHT LEG: Primary | ICD-10-CM

## 2025-07-30 DIAGNOSIS — L03.115 CELLULITIS AND ABSCESS OF RIGHT LEG: Primary | ICD-10-CM

## 2025-07-30 PROCEDURE — 3078F DIAST BP <80 MM HG: CPT | Mod: CPTII,S$GLB,,

## 2025-07-30 PROCEDURE — 1111F DSCHRG MED/CURRENT MED MERGE: CPT | Mod: CPTII,S$GLB,,

## 2025-07-30 PROCEDURE — 99213 OFFICE O/P EST LOW 20 MIN: CPT | Mod: S$GLB,,,

## 2025-07-30 PROCEDURE — 1159F MED LIST DOCD IN RCRD: CPT | Mod: CPTII,S$GLB,,

## 2025-07-30 PROCEDURE — 3008F BODY MASS INDEX DOCD: CPT | Mod: CPTII,S$GLB,,

## 2025-07-30 PROCEDURE — 99999 PR PBB SHADOW E&M-EST. PATIENT-LVL III: CPT | Mod: PBBFAC,,,

## 2025-07-30 PROCEDURE — 3074F SYST BP LT 130 MM HG: CPT | Mod: CPTII,S$GLB,,

## 2025-07-30 NOTE — PROGRESS NOTES
"    CHIEF COMPLAINT     Chief Complaint   Patient presents with    Follow-up       HPI     Mike Yadav is a 39 y.o. male who presents for xxx today.    PCP is Edward Quiros MD, patient is known to me. This note was generated with the assistance of ambient listening technology. Verbal consent was obtained by the patient and accompanying visitor(s) for the recording of patient appointment to facilitate this note. I attest to having reviewed and edited the generated note for accuracy, though some syntax or spelling errors may persist. Please contact the author of this note for any clarification.     History of Present Illness    CHIEF COMPLAINT:  Patient presents today for follow up of a wound after seeing wound care specialist.    WOUND MANAGEMENT:  He reports the wound recently opened up and appears to be healing. He has stopped deeply packing the wound since it opened, noting that when he attempted to pack it once, it "did not want" to be packed. He acknowledges the wound has tunneling and expresses significant concern about its depth and potential for complications. Since the wound opened and drained, swelling has significantly decreased. He is currently applying ointment around the wound as instructed and continues to monitor its healing progress. He has two days remaining in his prescribed antibiotic course and verbalizes desire to ensure proper protection and continuation of antibiotic treatment to prevent further issues.      ROS:  General: -fever, -chills, -fatigue, -weight gain, -weight loss  Eyes: -vision changes, -redness, -discharge  ENT: -ear pain, -nasal congestion, -sore throat  Cardiovascular: -chest pain, -palpitations, -lower extremity edema  Respiratory: -cough, -shortness of breath  Gastrointestinal: -abdominal pain, -nausea, -vomiting, -diarrhea, -constipation, -blood in stool  Genitourinary: -dysuria, -hematuria, -frequency  Musculoskeletal: -joint pain, -muscle pain  Skin: -rash, -lesion, " "+wound  Neurological: -headache, -dizziness, -numbness, -tingling  Psychiatric: -anxiety, -depression, -sleep difficulty          Past Medical History:  Past Medical History:   Diagnosis Date    Concussion 2019       Home Medications:  Prior to Admission medications    Medication Sig Start Date End Date Taking? Authorizing Provider   EScitalopram oxalate (LEXAPRO) 10 MG tablet Take 10 mg by mouth once daily.   Yes Provider, Historical   mupirocin (BACTROBAN) 2 % ointment Apply topically 2 (two) times daily. 7/25/25  Yes Jayesh Parks, NP   cefdinir (OMNICEF) 300 MG capsule Take 1 capsule (300 mg total) by mouth 2 (two) times daily. for 14 days 7/15/25 7/29/25  STEPHEN Pradhan MD       Review of Systems:  Review of Systems   Constitutional: Negative.    Respiratory: Negative.     Cardiovascular: Negative.    Skin:  Positive for wound.       Health Maintainence:   Immunizations:  Health Maintenance         Date Due Completion Date    Hepatitis C Screening Never done ---    Lipid Panel Never done ---    HIV Screening Never done ---    Hemoglobin A1c (Diabetic Prevention Screening) Never done ---    COVID-19 Vaccine (3 - 2024-25 season) 09/01/2024 11/30/2021    Influenza Vaccine (1) 09/01/2025 ---    TETANUS VACCINE 04/28/2034 4/28/2024    RSV Vaccine (Age 60+ and Pregnant patients) (1 - 1-dose 75+ series) 12/15/2062 ---             PHYSICAL EXAM     /70 (BP Location: Right arm, Patient Position: Sitting)   Pulse (!) 46   Ht 5' 9" (1.753 m)   Wt 85.2 kg (187 lb 13.3 oz)   SpO2 98%   BMI 27.74 kg/m²     Physical Exam  Vitals reviewed.   Constitutional:       Appearance: He is well-developed.   HENT:      Head: Normocephalic and atraumatic.   Eyes:      Conjunctiva/sclera: Conjunctivae normal.   Cardiovascular:      Rate and Rhythm: Normal rate.   Pulmonary:      Effort: Pulmonary effort is normal. No respiratory distress.   Skin:     General: Skin is warm and dry.      Findings: Wound present. No " rash.             Comments: See photo   Neurological:      Mental Status: He is alert and oriented to person, place, and time.      Coordination: Coordination normal.   Psychiatric:         Behavior: Behavior normal.           ASSESSMENT/PLAN   Assessment & Plan    PLAN SUMMARY:  - Continue current wound care regimen  - Complete oral antibiotics course (2 more days)  - Apply antibiotic ointment around wound (not inside)  - Keep wound covered and protected  - Wound photograph taken for patient's chart  - Follow-up in 1 week to reassess wound healing progress    CHRONIC ULCER OF LOWER LEG:  - Assessed wound healing progress after recent procedure by wound care specialist surgeon.  - Noted significant opening of wound, allowing for drainage and reduction in swelling, with potential tunneling in one area.  - The wound shows serosanguineous drainage, reduced swelling, granulation tissue, and no major signs of infection.  - Current wound care regimen will continue with oral antibiotics for 2 more days until completion of course and application of antibiotic ointment around the wound (not inside).  - Instructed patient to keep wound covered and protected.  - Explained the difference between normal pink granulation tissue and concerning erythema indicative of infection.  - Advised to monitor for increased swelling, erythema, or purulent drainage to prevent need for further antibiotics.  - Wound photograph taken for patient's chart.    FOLLOW-UP:  - Scheduled follow up in 1 week to reassess wound healing progress.  - Patient instructed to contact the office sooner if noticing increased swelling, erythema, or purulent drainage.          Mike was seen today for follow-up.    Diagnoses and all orders for this visit:    Cellulitis and abscess of right leg          Jayesh Parks NP   Department of Internal Medicine - [facility]  8:50 AM

## 2025-08-05 ENCOUNTER — OFFICE VISIT (OUTPATIENT)
Dept: INTERNAL MEDICINE | Facility: CLINIC | Age: 40
End: 2025-08-05
Payer: COMMERCIAL

## 2025-08-05 VITALS
WEIGHT: 184.31 LBS | HEART RATE: 67 BPM | DIASTOLIC BLOOD PRESSURE: 71 MMHG | SYSTOLIC BLOOD PRESSURE: 108 MMHG | BODY MASS INDEX: 27.3 KG/M2 | OXYGEN SATURATION: 96 % | HEIGHT: 69 IN

## 2025-08-05 DIAGNOSIS — L03.115 CELLULITIS AND ABSCESS OF RIGHT LEG: Primary | ICD-10-CM

## 2025-08-05 DIAGNOSIS — L02.415 CELLULITIS AND ABSCESS OF RIGHT LEG: Primary | ICD-10-CM

## 2025-08-05 PROCEDURE — 3008F BODY MASS INDEX DOCD: CPT | Mod: CPTII,S$GLB,,

## 2025-08-05 PROCEDURE — 1159F MED LIST DOCD IN RCRD: CPT | Mod: CPTII,S$GLB,,

## 2025-08-05 PROCEDURE — 99999 PR PBB SHADOW E&M-EST. PATIENT-LVL III: CPT | Mod: PBBFAC,,,

## 2025-08-05 PROCEDURE — 3074F SYST BP LT 130 MM HG: CPT | Mod: CPTII,S$GLB,,

## 2025-08-05 PROCEDURE — 1111F DSCHRG MED/CURRENT MED MERGE: CPT | Mod: CPTII,S$GLB,,

## 2025-08-05 PROCEDURE — 3078F DIAST BP <80 MM HG: CPT | Mod: CPTII,S$GLB,,

## 2025-08-05 PROCEDURE — 99213 OFFICE O/P EST LOW 20 MIN: CPT | Mod: S$GLB,,,

## 2025-08-05 NOTE — PROGRESS NOTES
"    CHIEF COMPLAINT     Chief Complaint   Patient presents with    Follow-up       HPI     Mike Yadav is a 39 y.o. male who presents for xxx today.    PCP is Edward Quiros MD, patient is known to me.      History of Present Illness        Pt here for wound check. Denies s/s iof infection. Doing dressing changes and using mupirocin. Has not had to continue packing d/t healing.       Past Medical History:  Past Medical History:   Diagnosis Date    Concussion 2019       Home Medications:  Prior to Admission medications    Medication Sig Start Date End Date Taking? Authorizing Provider   EScitalopram oxalate (LEXAPRO) 10 MG tablet Take 10 mg by mouth once daily.  Patient not taking: Reported on 8/5/2025    Provider, Historical   mupirocin (BACTROBAN) 2 % ointment Apply topically 2 (two) times daily.  Patient not taking: Reported on 8/5/2025 7/25/25   Jayesh Parks NP       Review of Systems:  Review of Systems   Constitutional: Negative.    Respiratory: Negative.     Cardiovascular: Negative.    Skin:  Positive for wound.       Health Maintainence:   Immunizations:  Health Maintenance         Date Due Completion Date    Hepatitis C Screening Never done ---    Lipid Panel Never done ---    HIV Screening Never done ---    Hemoglobin A1c (Diabetic Prevention Screening) Never done ---    COVID-19 Vaccine (3 - 2024-25 season) 09/01/2024 11/30/2021    Influenza Vaccine (1) 09/01/2025 ---    TETANUS VACCINE 04/28/2034 4/28/2024    RSV Vaccine (Age 60+ and Pregnant patients) (1 - 1-dose 75+ series) 12/15/2062 ---             PHYSICAL EXAM     /71 (BP Location: Right arm, Patient Position: Sitting)   Pulse 67   Ht 5' 9" (1.753 m)   Wt 83.6 kg (184 lb 4.9 oz)   SpO2 96%   BMI 27.22 kg/m²     Physical Exam  Vitals reviewed.   Constitutional:       Appearance: He is well-developed.   HENT:      Head: Normocephalic and atraumatic.   Eyes:      Conjunctiva/sclera: Conjunctivae normal.   Cardiovascular:     "  Rate and Rhythm: Normal rate.   Pulmonary:      Effort: Pulmonary effort is normal. No respiratory distress.   Skin:     General: Skin is warm and dry.      Findings: Wound present. No rash.          Neurological:      Mental Status: He is alert and oriented to person, place, and time.      Coordination: Coordination normal.   Psychiatric:         Behavior: Behavior normal.           ASSESSMENT/PLAN   Assessment & Plan    Wound significantly improved. Healing well without s/s of infection. Pt instructed to continue dressing changes with mupirocin. If wound changes in any way apart from continued healing pt is to f/u though portal with photo or in person.           Mike was seen today for follow-up.    Diagnoses and all orders for this visit:    Cellulitis and abscess of right leg          Jayesh Parks NP   Department of Internal Medicine - [facility]  10:22 AM

## (undated) DEVICE — GAUZE CURAD IODOFORM .25IN 5YD

## (undated) DEVICE — NDL HYPO REG 25G X 1 1/2

## (undated) DEVICE — SOL BETADINE 5%

## (undated) DEVICE — SOL POVIDONE SCRUB IODINE 4 OZ

## (undated) DEVICE — GLOVE BIOGEL 7.5

## (undated) DEVICE — NDL HYPO STD REG BVL 18GX1.5IN

## (undated) DEVICE — KIT COLLECTION E SWAB MINI

## (undated) DEVICE — STRIP PKNG IODO STRL 1/2INX5YD

## (undated) DEVICE — APPLICATOR CHLORAPREP ORN 26ML

## (undated) DEVICE — ELECTRODE REM PLYHSV RETURN 9

## (undated) DEVICE — DRAIN PENRS SIL STRL .25X18IN

## (undated) DEVICE — SPONGE COTTON TRAY 4X4IN

## (undated) DEVICE — SUT 2/0 30IN SILK BLK BRAI

## (undated) DEVICE — SUT SILK 0 BLK BR CT-1 30IN

## (undated) DEVICE — SUT VICRYL PLUS 3-0 SH 18IN

## (undated) DEVICE — GLOVE BIOGEL 7.0

## (undated) DEVICE — SUT MCRYL PLUS 4-0 PS2 27IN

## (undated) DEVICE — GOWN ORBIS LVL 4 XXL 49IN

## (undated) DEVICE — SYR 10CC LUER LOCK

## (undated) DEVICE — DRAPE LAP T SHT W/ INSTR PAD

## (undated) DEVICE — Device

## (undated) DEVICE — SOL IRR SOD CHL .9% POUR

## (undated) DEVICE — SPONGE BULKEE II ABSRB 6X6.75

## (undated) DEVICE — SUT 0 VICRYL PLUS CT-1 27IN

## (undated) DEVICE — GLOVE BIOGEL SKINSENSE PI 7.0